# Patient Record
Sex: FEMALE | Race: WHITE | NOT HISPANIC OR LATINO | Employment: OTHER | URBAN - METROPOLITAN AREA
[De-identification: names, ages, dates, MRNs, and addresses within clinical notes are randomized per-mention and may not be internally consistent; named-entity substitution may affect disease eponyms.]

---

## 2017-02-24 ENCOUNTER — ALLSCRIPTS OFFICE VISIT (OUTPATIENT)
Dept: OTHER | Facility: OTHER | Age: 47
End: 2017-02-24

## 2017-02-28 LAB — PAP (HISTORICAL): NORMAL

## 2017-05-24 ENCOUNTER — GENERIC CONVERSION - ENCOUNTER (OUTPATIENT)
Dept: OTHER | Facility: OTHER | Age: 47
End: 2017-05-24

## 2017-06-09 ENCOUNTER — LAB CONVERSION - ENCOUNTER (OUTPATIENT)
Dept: OTHER | Facility: OTHER | Age: 47
End: 2017-06-09

## 2017-06-09 ENCOUNTER — GENERIC CONVERSION - ENCOUNTER (OUTPATIENT)
Dept: OTHER | Facility: OTHER | Age: 47
End: 2017-06-09

## 2017-06-09 LAB
ESTRADIOL, SENSITIVE (HISTORICAL): 351 PG/ML
FSH (HISTORICAL): 12.1 MIU/ML
TSH SERPL DL<=0.05 MIU/L-ACNC: 1.76 MIU/L

## 2017-11-09 ENCOUNTER — GENERIC CONVERSION - ENCOUNTER (OUTPATIENT)
Dept: OTHER | Facility: OTHER | Age: 47
End: 2017-11-09

## 2017-11-09 DIAGNOSIS — Z01.419 ENCOUNTER FOR GYNECOLOGICAL EXAMINATION WITHOUT ABNORMAL FINDING: ICD-10-CM

## 2017-11-21 ENCOUNTER — HOSPITAL ENCOUNTER (OUTPATIENT)
Dept: RADIOLOGY | Facility: HOSPITAL | Age: 47
Discharge: HOME | End: 2017-11-21
Attending: OBSTETRICS & GYNECOLOGY
Payer: COMMERCIAL

## 2017-11-21 DIAGNOSIS — Z01.419 ENCOUNTER FOR GYNECOLOGICAL EXAMINATION WITHOUT ABNORMAL FINDING: ICD-10-CM

## 2017-11-21 PROCEDURE — G0202 SCR MAMMO BI INCL CAD: HCPCS

## 2018-01-11 NOTE — MISCELLANEOUS
Message   Recorded as Task   Date: 06/09/2017 09:36 AM, Created By: Melissa Garcia   Task Name: Follow Up   Assigned To: [employer]   Regarding Patient: Deon Marti, Status: In Progress   Emperatriz Dwight - 09 Jun 2017 9:36 AM     TASK CREATED  Call Donal Pena her thyroid tests end hormone tests are normal range  Aura White - 09 Jun 2017 9:36 AM     TASK IN PROGRESS   [place],Aura - 09 Jun 2017 9:38 AM     TASK EDITED  Patient is aware of both tests results  Active Problems    1  Abnormal mammogram (793 80) (R92 8)   2  Abnormal perimenopausal bleeding (627 0) (N92 4)   3  Dysfunctional uterine bleeding (626 8) (N93 8)   4  Encounter for gynecological examination without abnormal finding (V72 31) (Z01 419)   5  Encounter for routine gynecological examination (V72 31) (Z01 419)   6  Encounter for routine gynecological examination (V72 31) (Z01 419)   7  Encounter for screening colonoscopy (V76 51) (Z12 11)   8  Encounter for screening mammogram for malignant neoplasm of breast (V76 12)   (Z12 31)   9  Need for prophylactic vaccination and inoculation against influenza (V04 81) (Z23)   10  Pap smear abnormality of cervix with ASCUS favoring benign (795 01) (R87 610)   11  Pap smear, as part of routine gynecological examination (V76 2) (Z01 419)   12  Visit for screening mammogram (V76 12) (Z12 31)    Current Meds   1  Multi-Vitamin TABS; Therapy: (Recorded:14Pqs7198) to Recorded    Allergies    1   No Known Drug Allergies    Signatures   Electronically signed by : Gualberto Spence, ; Jun 9 2017  9:39AM EST                       (Author)

## 2018-01-13 VITALS
DIASTOLIC BLOOD PRESSURE: 70 MMHG | SYSTOLIC BLOOD PRESSURE: 116 MMHG | WEIGHT: 172 LBS | HEIGHT: 61 IN | BODY MASS INDEX: 32.47 KG/M2

## 2018-01-13 NOTE — RESULT NOTES
Verified Results  * MAMMO SCREENING BILATERAL W CAD [id number] 10:13AM Valdo Tsang Order Number: [id number]     Test Name Result Flag Reference   MAMMO SCREENING BILATERAL W CAD (Report)     Patient History:   Patient is nulliparous  Family history of colorectal cancer in father at age 76  Benign WB stereo brst biopsy of the left breast, September 25, 2012  Pathology Report of both breasts, September 25, 2012  Took hormonal contraceptives for 16 years  Patient has never smoked  Patient's BMI is 31 4      Reason for exam: screening (asymptomatic)  Mammo Screening Bilateral W CAD: November 9, 2016 - Check In #:    [de-identified]   Bilateral CC and MLO view(s) were taken  Technologist: RT Macey(MELIDA)(M)   Prior study comparison: November 3, 2015, bilateral digital    screening mammogram performed at [address] [place] [address]     September 29, 2014, bilateral digital screening mammogram    performed at [address] [place] [address]      There are scattered fibroglandular densities  No dominant soft tissue mass, architectural distortion or    suspicious calcifications are noted in the left breast  The skin   and nipple contours are within normal limits  An irregular    density is present in the upper outer right breast  Spot    compression views recommended        ASSESSMENT: BiRad:0 - Incomplete: needs additional imaging    evaluation     A breast health care nurse from our facility will be contacting    the patient regarding the need for additional imaging  Recommendation:   Further imaging of the right breast    Analyzed by CAD     8-10% of cancers will be missed on mammography  Management of a    palpable abnormality must be based on clinical grounds  Patients   will be notified of their results via letter from our facility  Accredited by [employer] and FDA       Transcription Location: [facility] 98: [id number]     Risk Value(s): Sil-Sydnee 10 Year: 4 331%, Sil-Sydnee Lifetime: 22 662%,    Myriad Table: 1 5%, JOEL 5 Year: 0 9%, NCI Lifetime: 8 4%   Signed by:   Nolvia Watts MD   11/9/16                               Plan  Encounter for screening colonoscopy    · COLONOSCOPY ; every 5 years;  Last 36HLH8224; Next 51Rlf5690; Status:Active

## 2018-01-13 NOTE — MISCELLANEOUS
Message   Recorded as Task   Date: 11/18/2016 10:16 AM, Created By: Clarissa Obrien   Task Name: Follow Up   Assigned To: Canelo Maya   Regarding Patient: Bernice Salinas, Status: In Progress   Melany Wooten - 18 Nov 2016 10:16 AM     TASK CREATED  Call Miranda Rack her right breast ultrasound diagnostic mammogram were interpreted as normal she may return to routine screening bilateral mammogram in one year after her last exam    Cody Mccormack - 18 Nov 2016 10:25 AM     TASK IN PROGRESS   Cody Mccormack - 18 Nov 2016 10:47 AM     TASK EDITED                 lm making pt aware of results  Active Problems    1  Abnormal mammogram (793 80) (R92 8)   2  Encounter for gynecological examination without abnormal finding (V72 31) (Z01 419)   3  Encounter for routine gynecological examination (V72 31) (Z01 419)   4  Encounter for routine gynecological examination (V72 31) (Z01 419)   5  Encounter for screening colonoscopy (V76 51) (Z12 11)   6  Encounter for screening mammogram for malignant neoplasm of breast (V76 12)   (Z12 31)   7  Need for prophylactic vaccination and inoculation against influenza (V04 81) (Z23)   8  Pap smear abnormality of cervix with ASCUS favoring benign (795 01) (R87 610)   9  Pap smear, as part of routine gynecological examination (V76 2) (Z01 419)   10  Visit for screening mammogram (V76 12) (Z12 31)    Current Meds   1  Multi-Vitamin TABS; Therapy: (Recorded:51Ppe7859) to Recorded    Allergies    1   No Known Drug Allergies    Signatures   Electronically signed by : Nessa Moe LPN; Nov 18 2447 24:42PT EST                       (Author)

## 2018-01-14 NOTE — RESULT NOTES
Verified Results  (1) [facility] 51CZP5619 08:41AM [facility]     Test Name Result Flag Reference   [facility] 12 1 mIU/mL     Reference Range                        Follicular Phase       6 0-72 0               Mid-cycle Peak         3 1-17 7               Luteal Phase           1 5- 9 1               Postmenopausal       23 0-116 3     (1) ESTRADIOL 64OIL1403 08:41AM [facility]     Test Name Result Flag Reference   ESTRADIOL, ULTRASENSITIVE$LC/MS/ pg/mL     Adult Female Reference Ranges for Estradiol,    Ultrasensitive, LC/MS/MS:       Follicular Phase:       pg/mL    Luteal Phase:           pg/mL    Postmenopausal Phase: < or = 10 pg/mL        Pediatric Female Reference Ranges for Estradiol,    Ultrasensitive, LC/MS/MS:       Pre-pubertal      (1-9 years):     < or = 16 pg/mL    10-11 years:       < or = 65 pg/mL    12-14 years:       < or = 142 pg/mL    15-17 years:       < or = 283 pg/mL     This test was developed and its analytical performance  characteristics have been determined by [facility]  It has not been  cleared or approved by FDA  This assay has been validated  pursuant to the CLIA regulations and is used for clinical  purposes       (Q) TSH, 3RD GENERATION [id number] 08:41AM [facility]     Test Name Result Flag Reference   TSH 1 76 mIU/L     Reference Range                         > or = 20 Years  0 40-4 50                              Pregnancy Ranges            First trimester    0 26-2 66            Second trimester   0 55-2 73            Third trimester    0 43-2 91

## 2018-01-16 NOTE — RESULT NOTES
Verified Results  * MAMMO SCREENING BILATERAL W CAD [id number] 10:45AM Zuri Zavala Order Number: [id number]    - Patient Instructions: To schedule this appointment, please contact Central Scheduling at [phone]  Do not wear any perfume, powder, lotion or deodorant on breast or underarm area  Please bring your doctors order, referral (if needed) and insurance information with you on the day of the test  Failure to bring this information may result in this test being rescheduled  Arrive 15 minutes prior to your appointment time to register  On the day of your test, please bring any prior mammogram or breast studies with you that were not performed at a [facility]  Failure to bring prior exams may result in your test needing to be rescheduled  Test Name Result Flag Reference   MAMMO SCREENING BILATERAL W CAD (Report)     Patient History:   Patient is nulliparous  Family history of colorectal cancer at age 76 in father, breast    cancer at age 67 in paternal aunt, breast cancer at age 72 in    mother  Benign WB stereo brst biopsy of the left breast, September 25, 2012  Pathology Report of both breasts, September 25, 2012  Took hormonal contraceptives for 16 years  Patient has never smoked  Patient's BMI is 31 4      Reason for exam: screening, asymptomatic  Mammo Screening Bilateral W CAD: November 21, 2017 - Check In #:    [de-identified]   Bilateral MLO and CC view(s) were taken  XCCL view(s) were taken   of the left breast      Technologist: RT Maribel(R)(M)   Prior study comparison: November 15, 2016, mammo diagnostic right   W CAD, performed at [address] [place] [address]  November 9, 2016, mammo screening bilateral W CAD performed at [facility]      There are scattered fibroglandular densities       No dominant soft tissue mass, architectural distortion or    suspicious calcifications are noted in either breast   The skin    and nipple structures are within normal limits  Scattered benign   appearing calcifications are noted  No evidence of malignancy  No significant changes when compared with prior studies  ACR BI-RADSï¾® Assessments: BiRad:2 - Benign     Recommendation:   Routine screening mammogram of both breasts in 1 year  Analyzed by CAD     The patient is scheduled in a reminder system for screening    mammography  8-10% of cancers will be missed on mammography  Management of a    palpable abnormality must be based on clinical grounds  Patients   will be notified of their results via letter from our facility  Accredited by [employer] and FDA       Transcription Location: [facility] 98: [id number]     Risk Value(s):   Tyrer-Cuzick 10 Year: 5 600%, Tyrer-Cuzick Lifetime: 27 200%,    Myriad Table: 1 5%, JOEL 5 Year: 4 0%, NCI Lifetime: 26 5%   Signed by:   Dulce Magallanes MD   11/22/17

## 2018-01-17 NOTE — MISCELLANEOUS
Message   Recorded as Task   Date: 05/24/2017 11:03 AM, Created By: Bertin Rogel   Task Name: Medical Complaint Callback   Assigned To: Ta Vargas   Regarding Patient: Sharron Modi, Status: In Progress   Qirosalva Alvarez - 24 May 2017 11:03 AM     TASK CREATED  Caller: Self; Medical Complaint; [phone] (Home); [phone] (Work)  Patient calling because she is missing a period every other month since December  They last about 2-3 days at a time  When she saw Dr Daniel Marcum last she had missed one period and she had told him about it  Monique Hathaway - 24 May 2017 11:08 AM     TASK IN PROGRESS   Monique Hathaway - 24 May 2017 11:19 AM     TASK EDITED  Patient has been having irregular periods since December  She addressed this with you on her 2/24/17 appointment  Update on bleeding:    March- she had 2 days of light bleeding and cramps    April- no period    May-light bleeding and cramping    Not sure when her mom went through menopause  She doesn't think it was at a early age  No hot flashes or night sweats  Taking IBUprofen 800mg for her cramps  She is sexually active and using no birth control at the current time  She wants to know if she should be worried about her bleeding or if she should have a work up  I told her it may just be perimenopausal but I would discuss with you  Please advise  Monique Hathaway - 24 May 2017 11:20 AM     TASK REASSIGNED: Previously Assigned To [facility],Team  Send back to GYN team    Kimberley Carmichael - 24 May 2017 1:54 PM     TASK REPLIED TO: Previously Assigned To Maria C Burgos a serum FSH and estradiol level due to irregular bleeding  Order a serum TSH also   Monique Hathaway - 24 May 2017 2:03 PM     TASK EDITED  Shree with name of lab and with any questions  Need to order B/W in computer and send to patient via mail  Monique Hathaway - 24 May 2017 3:07 PM     TASK EDITED  [facility] lab slip sent to patient via mail     Monique Benito - 24 May 2017 3:07 PM     TASK COMPLETED   Rivas,Monique - 24 May 2017 3:07 PM     TASK REACTIVATED   Monique Hathaway - 24 May 2017 3:07 PM     TASK IN PROGRESS        Active Problems    1  Abnormal mammogram (793 80) (R92 8)   2  Abnormal perimenopausal bleeding (627 0) (N92 4)   3  Dysfunctional uterine bleeding (626 8) (N93 8)   4  Encounter for gynecological examination without abnormal finding (V72 31) (Z01 419)   5  Encounter for routine gynecological examination (V72 31) (Z01 419)   6  Encounter for routine gynecological examination (V72 31) (Z01 419)   7  Encounter for screening colonoscopy (V76 51) (Z12 11)   8  Encounter for screening mammogram for malignant neoplasm of breast (V76 12)   (Z12 31)   9  Need for prophylactic vaccination and inoculation against influenza (V04 81) (Z23)   10  Pap smear abnormality of cervix with ASCUS favoring benign (795 01) (R87 610)   11  Pap smear, as part of routine gynecological examination (V76 2) (Z01 419)   12  Visit for screening mammogram (V76 12) (Z12 31)    Current Meds   1  Multi-Vitamin TABS; Therapy: (Recorded:26Wyz4061) to Recorded    Allergies    1  No Known Drug Allergies    Plan  Dysfunctional uterine bleeding    · (1) ESTRADIOL; Status:Active - Retrospective Authorization; Requested for:50Buu8571;    · (1) FSH; Status:Active - Retrospective Authorization; Requested for:09Fsm8812;    · (1) TSH; Status:Active - Retrospective Authorization; Requested for:57Tlg8149;     Signatures   Electronically signed by :  Shahnaz Arnold, ; May 24 2017  3:07PM EST                       (Author)

## 2018-01-18 NOTE — MISCELLANEOUS
Message   Recorded as Task   Date: 11/09/2016 04:26 PM, Created By: Alexandria Whitaker   Task Name: Follow Up   Assigned To: Constantine Luke   Regarding Patient: Bahman Mendoza, Status: In Progress   Dayana Fer - 09 Nov 2016 4:26 PM     TASK CREATED  As Eddi Woodward been called to schedule a diagnostic right mammogram to evaluate a density in the breast    Pamela Arreagaon - 10 Nov 2016 8:54 AM     TASK IN PROGRESS   Pamela Katz - 10 Nov 2016 8:56 AM     TASK EDITED                 lmtcb, Rx in EHR  Pamelaalexander Arreagaon - 10 Nov 2016 9:09 AM     TASK EDITED                 made patient aware  Active Problems    1  Abnormal mammogram (793 80) (R92 8)   2  Encounter for gynecological examination without abnormal finding (V72 31) (Z01 419)   3  Encounter for routine gynecological examination (V72 31) (Z01 419)   4  Encounter for routine gynecological examination (V72 31) (Z01 419)   5  Encounter for screening colonoscopy (V76 51) (Z12 11)   6  Encounter for screening mammogram for malignant neoplasm of breast (V76 12)   (Z12 31)   7  Need for prophylactic vaccination and inoculation against influenza (V04 81) (Z23)   8  Pap smear abnormality of cervix with ASCUS favoring benign (795 01) (R87 610)   9  Pap smear, as part of routine gynecological examination (V76 2) (Z01 419)   10  Visit for screening mammogram (V76 12) (Z12 31)    Current Meds   1  Multi-Vitamin TABS; Therapy: (Recorded:04Kwb2518) to Recorded    Allergies    1   No Known Drug Allergies    Signatures   Electronically signed by : Jyotsna Chan LPN; Nov 10 6205  8:67WX EST                       (Author)

## 2018-01-22 VITALS
RESPIRATION RATE: 16 BRPM | OXYGEN SATURATION: 98 % | TEMPERATURE: 98.6 F | SYSTOLIC BLOOD PRESSURE: 100 MMHG | WEIGHT: 164.8 LBS | BODY MASS INDEX: 31.11 KG/M2 | HEIGHT: 61 IN | DIASTOLIC BLOOD PRESSURE: 62 MMHG | HEART RATE: 60 BPM

## 2018-02-12 ENCOUNTER — OFFICE VISIT (OUTPATIENT)
Dept: INTERNAL MEDICINE CLINIC | Age: 48
End: 2018-02-12
Payer: COMMERCIAL

## 2018-02-12 VITALS
DIASTOLIC BLOOD PRESSURE: 54 MMHG | SYSTOLIC BLOOD PRESSURE: 100 MMHG | BODY MASS INDEX: 32.36 KG/M2 | HEART RATE: 86 BPM | HEIGHT: 61 IN | OXYGEN SATURATION: 97 % | TEMPERATURE: 98.2 F | WEIGHT: 171.4 LBS

## 2018-02-12 DIAGNOSIS — Z13.220 SCREENING FOR HYPERLIPIDEMIA: Primary | ICD-10-CM

## 2018-02-12 DIAGNOSIS — H65.192 ACUTE EFFUSION OF LEFT EAR: ICD-10-CM

## 2018-02-12 DIAGNOSIS — Z00.00 HEALTHCARE MAINTENANCE: ICD-10-CM

## 2018-02-12 PROBLEM — L98.9 SKIN LESION OF SCALP: Status: ACTIVE | Noted: 2017-11-09

## 2018-02-12 PROCEDURE — 99213 OFFICE O/P EST LOW 20 MIN: CPT | Performed by: NURSE PRACTITIONER

## 2018-02-12 RX ORDER — FLUTICASONE PROPIONATE 50 MCG
2 SPRAY, SUSPENSION (ML) NASAL DAILY
Qty: 16 G | Refills: 0 | Status: SHIPPED | COMMUNITY
Start: 2018-02-12 | End: 2018-03-20

## 2018-02-12 NOTE — PROGRESS NOTES
Assessment/Plan:33L ear effusion:  No need for abx  Will start flonase  Return for new/worsening s/sx  Pt given lab slips for routine labs  Last labs 2016  Pt to schedule routine follow-up as well  Diagnoses and all orders for this visit:    Screening for hyperlipidemia  -     Comprehensive metabolic panel; Future  -     CBC and differential; Future  -     Lipid panel; Future    Healthcare maintenance  -     Comprehensive metabolic panel; Future  -     CBC and differential; Future  -     Lipid panel; Future    Acute effusion of left ear  -     fluticasone (FLONASE) 50 mcg/act nasal spray; 2 sprays into each nostril daily        Subjective:      Patient ID: Jenn Lerma is a 56 y o  female  HPI     Pt c/o L ear pain since yesterday  Pt describes as a sharp, shooting pain  Denies tinnitus  No decreased hearing  Pt states her L lower teeth hurt yesterday, but has since improved  No fever/chills  No cough, congestion, sore throat  No ill contacts  Pt did not get influenza this year  The following portions of the patient's history were reviewed and updated as appropriate: allergies, current medications, past family history, past medical history, past social history, past surgical history and problem list     Review of Systems   Constitutional: Negative for chills and fever  HENT: Positive for ear pain and postnasal drip  Negative for congestion, rhinorrhea, sinus pressure and sore throat  Eyes: Negative for visual disturbance  Respiratory: Negative for cough  Cardiovascular: Negative for chest pain  Gastrointestinal: Negative for nausea and vomiting  Musculoskeletal: Negative for myalgias  Skin: Negative for rash  Neurological: Negative for headaches  Past Medical History:   Diagnosis Date    Benign tumor of breast     left breast         Current Outpatient Prescriptions:     Multiple Vitamin (MULTIVITAMIN) tablet, Take 2 tablets by mouth daily    , Disp: , Rfl:     No Known Allergies    Social History   Past Surgical History:   Procedure Laterality Date    APPENDECTOMY      BREAST LUMPECTOMY Left     BREAST SURGERY      IA COLONOSCOPY FLX DX W/COLLJ SPEC WHEN PFRMD N/A 7/20/2016    Procedure: COLONOSCOPY;  Surgeon: Delphine Vicente MD;  Location: [facility]; Service: Gastroenterology     Family History   Problem Relation Age of Onset    Cancer Father     Colon cancer Father     Liver cancer Father     Breast cancer Mother     Lung cancer Mother     Liver cancer Mother        Objective:  /54 (BP Location: Left arm, Patient Position: Sitting, Cuff Size: Standard)   Pulse 86   Temp 98 2 °F (36 8 °C) (Tympanic)   Ht 5' 0 83" (1 545 m)   Wt 77 7 kg (171 lb 6 4 oz)   SpO2 97%   BMI 32 57 kg/m²      Physical Exam   Constitutional: She is oriented to person, place, and time  She appears well-developed and well-nourished  No distress  HENT:   Head: Normocephalic and atraumatic  Right Ear: Hearing, tympanic membrane, external ear and ear canal normal    Left Ear: Hearing, external ear and ear canal normal    Nose: Nose normal  Right sinus exhibits no maxillary sinus tenderness and no frontal sinus tenderness  Left sinus exhibits no maxillary sinus tenderness and no frontal sinus tenderness  Mouth/Throat: Uvula is midline, oropharynx is clear and moist and mucous membranes are normal    L ear mild effusion  + scarring noted to Tm  No erythema  Cardiovascular: Normal rate and regular rhythm  Pulmonary/Chest: Effort normal and breath sounds normal  No respiratory distress  She has no wheezes  Lymphadenopathy:     She has no cervical adenopathy  Neurological: She is alert and oriented to person, place, and time  Skin: Skin is warm and dry  Psychiatric: She has a normal mood and affect  Her behavior is normal  Judgment and thought content normal    Nursing note and vitals reviewed

## 2018-02-12 NOTE — PATIENT INSTRUCTIONS
L ear effusion:  No need for abx  Will start flonase  Return for new/worsening s/sx  Pt given lab slips for routine labs  Last labs 2016  Pt to schedule routine follow-up as well

## 2018-02-26 ENCOUNTER — OFFICE VISIT (OUTPATIENT)
Dept: OBGYN CLINIC | Facility: CLINIC | Age: 48
End: 2018-02-26
Payer: COMMERCIAL

## 2018-02-26 VITALS
HEIGHT: 61 IN | DIASTOLIC BLOOD PRESSURE: 60 MMHG | SYSTOLIC BLOOD PRESSURE: 100 MMHG | BODY MASS INDEX: 32.1 KG/M2 | WEIGHT: 170 LBS

## 2018-02-26 DIAGNOSIS — Z01.419 ENCOUNTER FOR GYNECOLOGICAL EXAMINATION (GENERAL) (ROUTINE) WITHOUT ABNORMAL FINDINGS: ICD-10-CM

## 2018-02-26 DIAGNOSIS — Z12.31 ENCOUNTER FOR SCREENING MAMMOGRAM FOR MALIGNANT NEOPLASM OF BREAST: Primary | ICD-10-CM

## 2018-02-26 PROCEDURE — G0145 SCR C/V CYTO,THINLAYER,RESCR: HCPCS | Performed by: OBSTETRICS & GYNECOLOGY

## 2018-02-26 PROCEDURE — 99396 PREV VISIT EST AGE 40-64: CPT | Performed by: OBSTETRICS & GYNECOLOGY

## 2018-02-26 NOTE — PROGRESS NOTES
Assessment/Plan: This 50-year-old patient is seen for her annual gyn evaluation  She still does require the use of Motrin 800 mg at 6-8 hours for menstrual cramps the 1st day of her  No problem-specific Assessment & Plan notes found for this encounter  Subjective:      Patient ID: Wilberto Cabral is a 56 y o  female  Wound this 56year-old patient has regular monthly menses a 30 day intervals lasting about 2 days  Her 1st day is heavy requiring changing of temp packs or pads every 3-4 hours and  She does require the use of Motrin 800 mg at 6-8 hour intervals for menstrual cramps the 1st day of her     She does not bleed in between periods she does have some dryness and discomfort with intercourse and I did suggest that she try a lubricant with intercourse  She has no hot flashes chronic headaches or fainting spells  She sleeps about 7 hours at night  Her bowel and bladder function are normal             Review of Systems   Constitutional: Negative  HENT: Negative  Eyes: Negative  Respiratory: Negative  Cardiovascular: Negative  Gastrointestinal: Negative  Endocrine: Negative  Genitourinary: Negative  Musculoskeletal: Negative  Skin: Negative  Allergic/Immunologic: Negative  Neurological: Negative  Hematological: Negative  Psychiatric/Behavioral: Negative  Objective:      /60 (BP Location: Right arm, Patient Position: Sitting, Cuff Size: Standard)   Ht 5' 1" (1 549 m)   Wt 77 1 kg (170 lb)   LMP 02/07/2018 (Exact Date)   BMI 32 12 kg/m²          Physical Exam   Constitutional: She is oriented to person, place, and time  She appears well-developed and well-nourished  HENT:   Head: Normocephalic  Neck: Normal range of motion  Neck supple  Cardiovascular: Normal rate, regular rhythm, normal heart sounds and intact distal pulses  Pulmonary/Chest: Effort normal and breath sounds normal    Abdominal: Soft   Bowel sounds are normal  Genitourinary: Uterus normal    Musculoskeletal: Normal range of motion  Neurological: She is alert and oriented to person, place, and time  Skin: Skin is warm and dry  Psychiatric: She has a normal mood and affect  Nursing note and vitals reviewed  uterus is anterior mobile normal size and well supported  No ovarian masses are identified  The cervix is normal to appearance  There is no blood or discharge in the vagina  The vulva is normal  Rectal exam shows no bladder masses in the rectum and no nodularity in the cul-de-sac   Breast exam is normal

## 2018-02-26 NOTE — PATIENT INSTRUCTIONS
The patient was told that her breast and pelvic exam are normal  I did give her a prescription for Motrin 800 mg to repeat every 6-8 hours as needed for severe cramps number 50 with 2 refills  She will return in 1 year for followup gyn evaluation

## 2018-03-05 LAB
LAB AP GYN PRIMARY INTERPRETATION: NORMAL
Lab: NORMAL

## 2018-03-12 ENCOUNTER — TRANSCRIBE ORDERS (OUTPATIENT)
Dept: LAB | Age: 48
End: 2018-03-12

## 2018-03-12 ENCOUNTER — APPOINTMENT (OUTPATIENT)
Dept: LAB | Age: 48
End: 2018-03-12
Payer: COMMERCIAL

## 2018-03-12 DIAGNOSIS — Z00.00 HEALTHCARE MAINTENANCE: ICD-10-CM

## 2018-03-12 DIAGNOSIS — Z13.220 SCREENING FOR HYPERLIPIDEMIA: ICD-10-CM

## 2018-03-12 LAB
ALBUMIN SERPL BCP-MCNC: 3.7 G/DL (ref 3.5–5)
ALP SERPL-CCNC: 92 U/L (ref 46–116)
ALT SERPL W P-5'-P-CCNC: 61 U/L (ref 12–78)
ANION GAP SERPL CALCULATED.3IONS-SCNC: 7 MMOL/L (ref 4–13)
AST SERPL W P-5'-P-CCNC: 37 U/L (ref 5–45)
BASOPHILS # BLD AUTO: 0.04 THOUSANDS/ΜL (ref 0–0.1)
BASOPHILS NFR BLD AUTO: 1 % (ref 0–1)
BILIRUB SERPL-MCNC: 0.43 MG/DL (ref 0.2–1)
BUN SERPL-MCNC: 21 MG/DL (ref 5–25)
CALCIUM SERPL-MCNC: 8.9 MG/DL (ref 8.3–10.1)
CHLORIDE SERPL-SCNC: 104 MMOL/L (ref 100–108)
CHOLEST SERPL-MCNC: 171 MG/DL (ref 50–200)
CO2 SERPL-SCNC: 27 MMOL/L (ref 21–32)
CREAT SERPL-MCNC: 0.79 MG/DL (ref 0.6–1.3)
EOSINOPHIL # BLD AUTO: 0.2 THOUSAND/ΜL (ref 0–0.61)
EOSINOPHIL NFR BLD AUTO: 3 % (ref 0–6)
ERYTHROCYTE [DISTWIDTH] IN BLOOD BY AUTOMATED COUNT: 12.8 % (ref 11.6–15.1)
GFR SERPL CREATININE-BSD FRML MDRD: 90 ML/MIN/1.73SQ M
GLUCOSE P FAST SERPL-MCNC: 99 MG/DL (ref 65–99)
HCT VFR BLD AUTO: 39.6 % (ref 34.8–46.1)
HDLC SERPL-MCNC: 46 MG/DL (ref 40–60)
HGB BLD-MCNC: 13.3 G/DL (ref 11.5–15.4)
LDLC SERPL CALC-MCNC: 107 MG/DL (ref 0–100)
LYMPHOCYTES # BLD AUTO: 2.12 THOUSANDS/ΜL (ref 0.6–4.47)
LYMPHOCYTES NFR BLD AUTO: 28 % (ref 14–44)
MCH RBC QN AUTO: 28.8 PG (ref 26.8–34.3)
MCHC RBC AUTO-ENTMCNC: 33.6 G/DL (ref 31.4–37.4)
MCV RBC AUTO: 86 FL (ref 82–98)
MONOCYTES # BLD AUTO: 0.62 THOUSAND/ΜL (ref 0.17–1.22)
MONOCYTES NFR BLD AUTO: 8 % (ref 4–12)
NEUTROPHILS # BLD AUTO: 4.57 THOUSANDS/ΜL (ref 1.85–7.62)
NEUTS SEG NFR BLD AUTO: 60 % (ref 43–75)
NRBC BLD AUTO-RTO: 0 /100 WBCS
PLATELET # BLD AUTO: 279 THOUSANDS/UL (ref 149–390)
PMV BLD AUTO: 10.4 FL (ref 8.9–12.7)
POTASSIUM SERPL-SCNC: 4.2 MMOL/L (ref 3.5–5.3)
PROT SERPL-MCNC: 7.4 G/DL (ref 6.4–8.2)
RBC # BLD AUTO: 4.62 MILLION/UL (ref 3.81–5.12)
SODIUM SERPL-SCNC: 138 MMOL/L (ref 136–145)
TRIGL SERPL-MCNC: 91 MG/DL
WBC # BLD AUTO: 7.56 THOUSAND/UL (ref 4.31–10.16)

## 2018-03-12 PROCEDURE — 85025 COMPLETE CBC W/AUTO DIFF WBC: CPT

## 2018-03-12 PROCEDURE — 80053 COMPREHEN METABOLIC PANEL: CPT

## 2018-03-12 PROCEDURE — 36415 COLL VENOUS BLD VENIPUNCTURE: CPT

## 2018-03-12 PROCEDURE — 80061 LIPID PANEL: CPT

## 2018-03-20 ENCOUNTER — OFFICE VISIT (OUTPATIENT)
Dept: INTERNAL MEDICINE CLINIC | Age: 48
End: 2018-03-20
Payer: COMMERCIAL

## 2018-03-20 VITALS
HEIGHT: 61 IN | TEMPERATURE: 97.3 F | BODY MASS INDEX: 32.44 KG/M2 | DIASTOLIC BLOOD PRESSURE: 64 MMHG | WEIGHT: 171.8 LBS | OXYGEN SATURATION: 98 % | SYSTOLIC BLOOD PRESSURE: 102 MMHG | HEART RATE: 62 BPM

## 2018-03-20 DIAGNOSIS — L98.9 SKIN LESION OF SCALP: Primary | ICD-10-CM

## 2018-03-20 PROBLEM — H65.192 ACUTE EFFUSION OF LEFT EAR: Status: RESOLVED | Noted: 2018-02-12 | Resolved: 2018-03-20

## 2018-03-20 PROCEDURE — 99213 OFFICE O/P EST LOW 20 MIN: CPT | Performed by: NURSE PRACTITIONER

## 2018-03-20 NOTE — PROGRESS NOTES
Assessment/Plan:    Scalp lesion:  Patient has a [facility]-up appointment with [facility]  Also given contact information for Dr Citlaly Davis in [place] to see if she possibly get a sooner follow-up  Patient will likely need a biopsy concern for squamous cell  Labs reviewed  Patient is continue with healthy lifestyle  Continue with a healthy diet-low-fat low-cholesterol limit sweets  Continue to exercise on a routine basis  Continue to trend blood pressure  Patient is asymptomatic  Patient follow-up in 1 year and as needed     Diagnoses and all orders for this visit:    Skin lesion of scalp        Subjective:      Patient ID: Sasha Aguilera is a 56 y o  female  HPI     Pt here for routine follow-up and to review labs  Pt is doing well with no concerns  Pt states that she has spot on top of her head that she is due to see Dermatology for  Patient has an appointment April 30th with [facility]  Patient would like to be seen sooner  States that her  has been monitoring and appears area has been getting bigger  Patient states that she does have itching to the area  Denies bleeding  Patient is not currently using any medication on area  Of note patient has had this lesion since around September 2017  Last Mammo: 11/2017 - followed by GYN - seen feb  PAP UTD  Colonoscopy:  Patient does have a family history of colon cancer  Her last colonoscopy was at age 41  Her next is to due at 53  Last labs:  03/2018    The following portions of the patient's history were reviewed and updated as appropriate: allergies, current medications, past family history, past medical history, past social history, past surgical history and problem list     Review of Systems   Constitutional: Negative for chills, fatigue and fever  HENT: Positive for postnasal drip  Negative for congestion and sore throat  Eyes: Positive for visual disturbance (wear glass  no changes in vision  last eye exam 1 year ago)  Respiratory: Positive for cough (lingering cough from URI - nonproductive)  Negative for chest tightness and shortness of breath  Cardiovascular: Negative for chest pain, palpitations and leg swelling  Gastrointestinal: Negative for abdominal pain, blood in stool, constipation, diarrhea, nausea and vomiting  Genitourinary: Negative for difficulty urinating, dysuria, hematuria and vaginal bleeding  Musculoskeletal: Negative for arthralgias and myalgias  Skin: Positive for wound  Negative for rash  Neurological: Negative for dizziness, syncope, light-headedness and headaches  Psychiatric/Behavioral: Negative for confusion, dysphoric mood and sleep disturbance  The patient is not nervous/anxious  Past Medical History:   Diagnosis Date    Abnormal mammogram     Benign tumor of breast     left breast    Dysfunctional uterine bleeding     Resolved: 11/9/2017    Skin lesion of scalp     last assessed: 11/9/2017         Current Outpatient Prescriptions:     fluticasone (FLONASE) 50 mcg/act nasal spray, 2 sprays into each nostril daily, Disp: 16 g, Rfl: 0    Multiple Vitamin (MULTIVITAMIN) tablet, Take 2 tablets by mouth daily  , Disp: , Rfl:     No Known Allergies    Social History   Past Surgical History:   Procedure Laterality Date    APPENDECTOMY      BREAST LUMPECTOMY Left     BREAST SURGERY Left 09/2012    bx    [place] COLONOSCOPY FLX DX W/COLLJ SPEC WHEN PFRMD N/A 7/20/2016    Procedure: COLONOSCOPY;  Surgeon: Marelyn Hammans, MD;  [facility];   Service: Gastroenterology     Family History   Problem Relation Age of Onset    Cancer Father     Colon cancer Father     Liver cancer Father     Bone cancer Father    Jillian Porter Breast cancer Mother     Lung cancer Mother     Liver cancer Mother        Objective:  /64 (BP Location: Left arm, Patient Position: Sitting, Cuff Size: Standard)   Pulse 62   Temp (!) 97 3 °F (36 3 °C) (Tympanic)   Ht 5' 0 79" (1 544 m)   Wt 77 9 kg (171 lb 12 8 oz)   SpO2 98%   BMI 32 69 kg/m²      Physical Exam   Constitutional: She is oriented to person, place, and time  She appears well-developed and well-nourished  No distress  Neck: No thyromegaly present  Cardiovascular: Normal rate and regular rhythm  Pulmonary/Chest: Effort normal and breath sounds normal  No respiratory distress  She has no wheezes  Musculoskeletal: Normal range of motion  Neurological: She is alert and oriented to person, place, and time  No focal deficits   Skin: Skin is warm and dry  Lesion (L frontal scalp  ulceration, flesh colored with some areas of redness  no underlying eryhema, drainage or concern for infection  no plaque, vesicles or pustules  when compared to picture of 11/2017 - positive change in color, now more flesh colored ) noted  No abrasion, no bruising, no burn, no ecchymosis, no laceration, no petechiae and no rash noted  No erythema  Thinning of hair to top of scalp   Psychiatric: She has a normal mood and affect   Her behavior is normal  Judgment and thought content normal

## 2018-03-20 NOTE — PATIENT INSTRUCTIONS
Pt is to follow-up with Dermatology for scalp lesion  Also recommend Dr Joaquina Hills:  [phone]:  [id number]    Continue with healthy lifestyle - healthy diet, low fat/low cholesterol  Exercise on a routine basis

## 2018-05-14 ENCOUNTER — OFFICE VISIT (OUTPATIENT)
Dept: OBGYN CLINIC | Facility: CLINIC | Age: 48
End: 2018-05-14
Payer: COMMERCIAL

## 2018-05-14 VITALS
WEIGHT: 179 LBS | SYSTOLIC BLOOD PRESSURE: 118 MMHG | BODY MASS INDEX: 33.79 KG/M2 | HEIGHT: 61 IN | DIASTOLIC BLOOD PRESSURE: 68 MMHG

## 2018-05-14 DIAGNOSIS — Z01.419 ENCOUNTER FOR GYNECOLOGICAL EXAMINATION (GENERAL) (ROUTINE) WITHOUT ABNORMAL FINDINGS: ICD-10-CM

## 2018-05-14 DIAGNOSIS — N91.2 AMENORRHEA: Primary | ICD-10-CM

## 2018-05-14 DIAGNOSIS — Z12.31 ENCOUNTER FOR SCREENING MAMMOGRAM FOR MALIGNANT NEOPLASM OF BREAST: ICD-10-CM

## 2018-05-14 PROCEDURE — 99396 PREV VISIT EST AGE 40-64: CPT | Performed by: OBSTETRICS & GYNECOLOGY

## 2018-05-14 NOTE — PROGRESS NOTES
Assessment/Plan: This 60-year-old patient is concerned because she not had a menses since February 2018  Diagnoses and all orders for this visit:    Encounter for gynecological examination (general) (routine) without abnormal findings    Encounter for screening mammogram for malignant neoplasm of breast  -     Mammo screening bilateral w cad; Future          Subjective:     Patient ID: Craig Raygoza is a 56 y o  female  HPI this 60-year-old patient has had regular menses lasting about 3 days requiring Motrin 800 mg for pain   Since February 2018 she has had no menses  She has some preliminary signs of having a period but it  is not happening  She has noticed some hot flashes and discomfort with intercourse  Review of Systems      Objective:     Physical Exam  pelvic exam reveals uterus to be anterior mobile normal size  No adnexal masses are present  There is no blood or discharge in the vagina

## 2018-05-16 ENCOUNTER — HOSPITAL ENCOUNTER (OUTPATIENT)
Dept: RADIOLOGY | Facility: HOSPITAL | Age: 48
Discharge: HOME | End: 2018-05-16
Attending: OBSTETRICS & GYNECOLOGY
Payer: COMMERCIAL

## 2018-05-16 ENCOUNTER — TRANSCRIBE ORDERS (OUTPATIENT)
Dept: LAB | Age: 48
End: 2018-05-16

## 2018-05-16 ENCOUNTER — APPOINTMENT (OUTPATIENT)
Dept: LAB | Age: 48
End: 2018-05-16
Payer: COMMERCIAL

## 2018-05-16 DIAGNOSIS — N91.2 AMENORRHEA: Primary | ICD-10-CM

## 2018-05-16 DIAGNOSIS — N91.2 AMENORRHEA: ICD-10-CM

## 2018-05-16 LAB
ESTRADIOL SERPL-MCNC: 129 PG/ML
FSH SERPL-ACNC: 16.3 MIU/ML

## 2018-05-16 PROCEDURE — 82670 ASSAY OF TOTAL ESTRADIOL: CPT

## 2018-05-16 PROCEDURE — 76830 TRANSVAGINAL US NON-OB: CPT

## 2018-05-16 PROCEDURE — 36415 COLL VENOUS BLD VENIPUNCTURE: CPT

## 2018-05-16 PROCEDURE — 76856 US EXAM PELVIC COMPLETE: CPT

## 2018-05-16 PROCEDURE — 83001 ASSAY OF GONADOTROPIN (FSH): CPT

## 2018-05-16 RX ORDER — MEDROXYPROGESTERONE ACETATE 10 MG/1
TABLET ORAL
Qty: 10 TABLET | Refills: 0 | Status: SHIPPED | OUTPATIENT
Start: 2018-05-16 | End: 2018-08-27 | Stop reason: ALTCHOICE

## 2018-05-16 NOTE — TELEPHONE ENCOUNTER
----- Message from Jayla Maher MD sent at 5/16/2018 12:48 PM EDT -----  Please call the patient regarding blood tests  Her blood does to not indicate menopause  Tell her to take provera 10 mg 2 at bedtime for 5 nights and let me know if she bleeds after he medication     If there is no menses  within 2 weeks of the 5 days of medication call and tell us  that she did  not bleed

## 2018-05-16 NOTE — TELEPHONE ENCOUNTER
----- Message from Artie Spence MD sent at 5/16/2018 12:48 PM EDT -----  Please call the patient regarding blood tests  Her blood does to not indicate menopause  Tell her to take provera 10 mg 2 at bedtime for 5 nights and let me know if she bleeds after he medication     If there is no menses  within 2 weeks of the 5 days of medication call and tell us  that she did  not bleed

## 2018-05-21 ENCOUNTER — TELEPHONE (OUTPATIENT)
Dept: OBGYN CLINIC | Facility: CLINIC | Age: 48
End: 2018-05-21

## 2018-05-21 NOTE — TELEPHONE ENCOUNTER
Spoke with Pt today via phone call  Pt informed Dr Christen Vinson reviewed Pt's recent pelvic ultrasound result  Ultrasound showed 2 small fibroids in Pt's uterus, lining of the uterus was consistent with being premenopausal (uterine lining measured 9 mm), ovaries were normal per Dr Carlos Irizarry review  Reiterated to Pt that if she has any questions/concerns, to contact office

## 2018-05-21 NOTE — TELEPHONE ENCOUNTER
----- Message from Yadira Aviles MD sent at 5/21/2018  8:13 AM EDT -----  Please call the patient regarding her pelvic ultrasound  Her ultrasound showed 2 small fibroids in her uterus  The lining of the uterus was consistent with being premenopausal   It measured 9 mm    Her ovaries were normal

## 2018-08-27 ENCOUNTER — OFFICE VISIT (OUTPATIENT)
Dept: INTERNAL MEDICINE CLINIC | Age: 48
End: 2018-08-27
Payer: COMMERCIAL

## 2018-08-27 VITALS
DIASTOLIC BLOOD PRESSURE: 70 MMHG | OXYGEN SATURATION: 97 % | WEIGHT: 185.8 LBS | BODY MASS INDEX: 34.19 KG/M2 | HEIGHT: 62 IN | TEMPERATURE: 98.4 F | HEART RATE: 85 BPM | SYSTOLIC BLOOD PRESSURE: 102 MMHG

## 2018-08-27 DIAGNOSIS — R06.00 DYSPNEA ON EXERTION: ICD-10-CM

## 2018-08-27 DIAGNOSIS — R63.5 WEIGHT GAIN: ICD-10-CM

## 2018-08-27 DIAGNOSIS — R60.0 BILATERAL LEG EDEMA: Primary | ICD-10-CM

## 2018-08-27 PROBLEM — Z13.220 SCREENING FOR HYPERLIPIDEMIA: Status: RESOLVED | Noted: 2018-02-12 | Resolved: 2018-08-27

## 2018-08-27 PROBLEM — Z00.00 HEALTHCARE MAINTENANCE: Status: RESOLVED | Noted: 2018-02-12 | Resolved: 2018-08-27

## 2018-08-27 PROBLEM — R06.09 DYSPNEA ON EXERTION: Status: ACTIVE | Noted: 2018-08-27

## 2018-08-27 PROCEDURE — 99214 OFFICE O/P EST MOD 30 MIN: CPT | Performed by: NURSE PRACTITIONER

## 2018-08-27 NOTE — PROGRESS NOTES
Assessment/Plan:    Pt presents for LE edema  Likely multifactorial with recent humidity, weight gain  Recommend elevation, low sodium diet and compression stockings as needed  Denies calf pain  No concern for DVT  She does experience exercise intolerance and intermittently dyspnea on exertion  likely secondary to weight gain and decreased exercise, however will check echo  Pt to follow-up in 2 weeks  She would like to discuss weight loss medication at that time  Diagnoses and all orders for this visit:    Bilateral leg edema  -     CBC and differential; Future  -     Comprehensive metabolic panel; Future  -     TSH, 3rd generation with Free T4 reflex; Future  -     Echo complete with contrast if indicated; Future    Dyspnea on exertion  -     CBC and differential; Future  -     Comprehensive metabolic panel; Future  -     TSH, 3rd generation with Free T4 reflex; Future  -     Echo complete with contrast if indicated; Future    Weight gain  -     TSH, 3rd generation with Free T4 reflex; Future        Subjective:      Patient ID: Stephane Tellez is a 53 y o  female  HPI    Pt presents for concern of elevated BP  She has no PMH of HTN and actually her BP has been low the last few appts  However she states that her son in law noticed she had swelling to B/L LE  Pt reports that she has noticed swelling to hands as well recently  Additionally she has noticed that she has decreased exercise tolerance and intermittent dyspnea on exertion  She reports a gradual weight gain of 15 lbs in the past 6 months  She has had no change in diet habits or exercise routine  Pt states she works as a , she does not sit for prolonged periods of time however  She denies high sodium diet    No PMH of HTN, CHF    The following portions of the patient's history were reviewed and updated as appropriate: allergies, current medications, past family history, past medical history, past social history, past surgical history and problem list     Review of Systems   Constitutional: Positive for unexpected weight change (gradual)  Negative for activity change, appetite change, chills and fever  Respiratory: Negative for chest tightness, shortness of breath and stridor  Exercise intolerance   Cardiovascular: Positive for leg swelling  Negative for chest pain and palpitations  Gastrointestinal: Negative for abdominal pain, constipation, diarrhea, nausea and vomiting  Endocrine: Positive for heat intolerance  Musculoskeletal: Negative for arthralgias, joint swelling and neck pain  Neurological: Negative for dizziness, syncope, light-headedness and headaches  Past Medical History:   Diagnosis Date    Abnormal mammogram     Benign tumor of breast     left breast    Dysfunctional uterine bleeding     Resolved: 11/9/2017    Skin lesion of scalp     last assessed: 11/9/2017         Current Outpatient Prescriptions:     medroxyPROGESTERone (PROVERA) 10 mg tablet, Take 2 tablets by mouth at bedtime for 5 nights , Disp: 10 tablet, Rfl: 0    Multiple Vitamin (MULTIVITAMIN) tablet, Take 2 tablets by mouth daily  , Disp: , Rfl:     No Known Allergies    Social History   Past Surgical History:   Procedure Laterality Date    APPENDECTOMY      BREAST LUMPECTOMY Left     BREAST SURGERY Left 09/2012    bx    [place] COLONOSCOPY FLX DX W/COLLJ SPEC WHEN PFRMD N/A 7/20/2016    Procedure: COLONOSCOPY;  Surgeon: Pedro Moura MD;  [facility];   Service: Gastroenterology     Family History   Problem Relation Age of Onset    Cancer Father     Colon cancer Father     Liver cancer Father     Bone cancer Father    [facility] Breast cancer Mother     Lung cancer Mother     Liver cancer Mother        Objective:  /70 (BP Location: Left arm, Patient Position: Sitting, Cuff Size: Standard)   Pulse 85   Temp 98 4 °F (36 9 °C) (Tympanic)   Ht 5' 1 89" (1 572 m)   Wt 84 3 kg (185 lb 12 8 oz)   SpO2 97%   BMI 34 10 kg/m²      Physical Exam   Constitutional: She is oriented to person, place, and time  She appears well-developed and well-nourished  No distress  obese   Neck: No JVD present  Cardiovascular: Normal rate, regular rhythm and normal heart sounds  No murmur heard  Trace B/L LE edema   Pulmonary/Chest: Effort normal and breath sounds normal  No respiratory distress  She has no wheezes  Musculoskeletal: Normal range of motion  She exhibits no edema, tenderness or deformity  Neurological: She is alert and oriented to person, place, and time  Skin: Skin is warm and dry  No rash noted  No erythema  Psychiatric: She has a normal mood and affect  Her behavior is normal  Judgment and thought content normal    Nursing note and vitals reviewed

## 2018-08-27 NOTE — PATIENT INSTRUCTIONS
Will check labs and echo  Likely multifactorial with humidity, sodium intake and weight contributing  Elevate legs in evening, reduce sodium

## 2018-08-28 ENCOUNTER — APPOINTMENT (OUTPATIENT)
Dept: LAB | Age: 48
End: 2018-08-28
Payer: COMMERCIAL

## 2018-08-28 ENCOUNTER — HOSPITAL ENCOUNTER (OUTPATIENT)
Dept: NON INVASIVE DIAGNOSTICS | Facility: HOSPITAL | Age: 48
Discharge: HOME | End: 2018-08-28
Payer: COMMERCIAL

## 2018-08-28 DIAGNOSIS — R06.00 DYSPNEA ON EXERTION: ICD-10-CM

## 2018-08-28 DIAGNOSIS — R60.0 BILATERAL LEG EDEMA: ICD-10-CM

## 2018-08-28 DIAGNOSIS — R63.5 WEIGHT GAIN: ICD-10-CM

## 2018-08-28 LAB
ALBUMIN SERPL BCP-MCNC: 3.2 G/DL (ref 3.5–5)
ALP SERPL-CCNC: 87 U/L (ref 46–116)
ALT SERPL W P-5'-P-CCNC: 48 U/L (ref 12–78)
ANION GAP SERPL CALCULATED.3IONS-SCNC: 7 MMOL/L (ref 4–13)
AST SERPL W P-5'-P-CCNC: 34 U/L (ref 5–45)
BASOPHILS # BLD AUTO: 0.04 THOUSANDS/ΜL (ref 0–0.1)
BASOPHILS NFR BLD AUTO: 0 % (ref 0–1)
BILIRUB SERPL-MCNC: 0.51 MG/DL (ref 0.2–1)
BUN SERPL-MCNC: 14 MG/DL (ref 5–25)
CALCIUM SERPL-MCNC: 8.9 MG/DL (ref 8.3–10.1)
CHLORIDE SERPL-SCNC: 104 MMOL/L (ref 100–108)
CO2 SERPL-SCNC: 25 MMOL/L (ref 21–32)
CREAT SERPL-MCNC: 0.72 MG/DL (ref 0.6–1.3)
EOSINOPHIL # BLD AUTO: 0.12 THOUSAND/ΜL (ref 0–0.61)
EOSINOPHIL NFR BLD AUTO: 1 % (ref 0–6)
ERYTHROCYTE [DISTWIDTH] IN BLOOD BY AUTOMATED COUNT: 12.5 % (ref 11.6–15.1)
GFR SERPL CREATININE-BSD FRML MDRD: 100 ML/MIN/1.73SQ M
GLUCOSE SERPL-MCNC: 93 MG/DL (ref 65–140)
HCT VFR BLD AUTO: 38.8 % (ref 34.8–46.1)
HGB BLD-MCNC: 12.4 G/DL (ref 11.5–15.4)
IMM GRANULOCYTES # BLD AUTO: 0.05 THOUSAND/UL (ref 0–0.2)
IMM GRANULOCYTES NFR BLD AUTO: 1 % (ref 0–2)
LYMPHOCYTES # BLD AUTO: 2.4 THOUSANDS/ΜL (ref 0.6–4.47)
LYMPHOCYTES NFR BLD AUTO: 26 % (ref 14–44)
MCH RBC QN AUTO: 28.4 PG (ref 26.8–34.3)
MCHC RBC AUTO-ENTMCNC: 32 G/DL (ref 31.4–37.4)
MCV RBC AUTO: 89 FL (ref 82–98)
MONOCYTES # BLD AUTO: 0.54 THOUSAND/ΜL (ref 0.17–1.22)
MONOCYTES NFR BLD AUTO: 6 % (ref 4–12)
NEUTROPHILS # BLD AUTO: 5.94 THOUSANDS/ΜL (ref 1.85–7.62)
NEUTS SEG NFR BLD AUTO: 66 % (ref 43–75)
NRBC BLD AUTO-RTO: 0 /100 WBCS
PLATELET # BLD AUTO: 282 THOUSANDS/UL (ref 149–390)
PMV BLD AUTO: 11 FL (ref 8.9–12.7)
POTASSIUM SERPL-SCNC: 3.9 MMOL/L (ref 3.5–5.3)
PROT SERPL-MCNC: 6.8 G/DL (ref 6.4–8.2)
RBC # BLD AUTO: 4.36 MILLION/UL (ref 3.81–5.12)
SODIUM SERPL-SCNC: 136 MMOL/L (ref 136–145)
TSH SERPL DL<=0.05 MIU/L-ACNC: 2 UIU/ML (ref 0.36–3.74)
WBC # BLD AUTO: 9.09 THOUSAND/UL (ref 4.31–10.16)

## 2018-08-28 PROCEDURE — 93306 TTE W/DOPPLER COMPLETE: CPT | Performed by: INTERNAL MEDICINE

## 2018-08-28 PROCEDURE — 36415 COLL VENOUS BLD VENIPUNCTURE: CPT

## 2018-08-28 PROCEDURE — 85025 COMPLETE CBC W/AUTO DIFF WBC: CPT

## 2018-08-28 PROCEDURE — 84443 ASSAY THYROID STIM HORMONE: CPT

## 2018-08-28 PROCEDURE — 80053 COMPREHEN METABOLIC PANEL: CPT

## 2018-08-28 PROCEDURE — 93306 TTE W/DOPPLER COMPLETE: CPT

## 2018-09-10 ENCOUNTER — TELEPHONE (OUTPATIENT)
Dept: INTERNAL MEDICINE CLINIC | Age: 48
End: 2018-09-10

## 2018-09-10 ENCOUNTER — OFFICE VISIT (OUTPATIENT)
Dept: INTERNAL MEDICINE CLINIC | Age: 48
End: 2018-09-10
Payer: COMMERCIAL

## 2018-09-10 VITALS
DIASTOLIC BLOOD PRESSURE: 64 MMHG | BODY MASS INDEX: 34.21 KG/M2 | SYSTOLIC BLOOD PRESSURE: 100 MMHG | HEIGHT: 61 IN | OXYGEN SATURATION: 97 % | HEART RATE: 75 BPM | WEIGHT: 181.2 LBS | TEMPERATURE: 98.8 F

## 2018-09-10 DIAGNOSIS — E66.9 OBESITY (BMI 30.0-34.9): Primary | ICD-10-CM

## 2018-09-10 DIAGNOSIS — R60.0 BILATERAL LEG EDEMA: ICD-10-CM

## 2018-09-10 PROBLEM — E66.811 OBESITY (BMI 30.0-34.9): Status: ACTIVE | Noted: 2018-09-10

## 2018-09-10 PROCEDURE — 3008F BODY MASS INDEX DOCD: CPT | Performed by: NURSE PRACTITIONER

## 2018-09-10 PROCEDURE — 99213 OFFICE O/P EST LOW 20 MIN: CPT | Performed by: NURSE PRACTITIONER

## 2018-09-10 NOTE — PROGRESS NOTES
Assessment/Plan:    Obesity:  Will start contrave  D/W pt side effects  Will gradually titrate up dosing  D/w pt the importance of low fat/low carb/high protein  Pt to attempt a 1500 calorie diet  D/w pt good fats/carbs and healthy protein as well  Reviewed with pt my fitness pal [employer] - and was downloaded to her phone at appt  D/w pt 30 mins of cardio 3x a week minimum  Pt to continue multivitamin  Reviewed labs  Pt to follow-up in 1 month  Diagnoses and all orders for this visit:    Obesity (BMI 30 0-34 9)  -     Naltrexone-Bupropion HCl ER 8-90 MG TB12; Take 1 tab PO every am x 1 week, then 1 tab twice a day x 1 week, then 2 tabs every am and 1 tab every PM x 1 week, then two tabs BID        Subjective:      Patient ID: Navin Chavez is a 53 y o  female  HPI     Obesity  Navin Chavez is a 53 y o  female who presents for evaluation of obesity  She has noted a gradual weight gain over the past few years  Previous treatments for obesity include:  None  Co-morbidities:  None  Cardiovascular risk factors besides obesity: none  No PMH of HTN, HLD  Pt states that LE edema is improved with elevation, low sodium diet  Denies [place]/CP  The following portions of the patient's history were reviewed and updated as appropriate: allergies, current medications, past family history, past medical history, past social history, past surgical history and problem list     The following portions of the patient's history were reviewed and updated as appropriate: allergies, current medications, past family history, past medical history, past social history, past surgical history and problem list     Review of Systems   Constitutional: Positive for unexpected weight change (gradual)  Negative for activity change, appetite change, chills and fever  Respiratory: Negative for chest tightness, shortness of breath and stridor           Exercise intolerance   Cardiovascular: Positive for leg swelling (improving)  Negative for chest pain and palpitations  Gastrointestinal: Negative for abdominal pain, constipation, diarrhea, nausea and vomiting  Musculoskeletal: Negative for arthralgias, joint swelling and neck pain  Neurological: Negative for dizziness, syncope, light-headedness and headaches  Psychiatric/Behavioral: Negative for dysphoric mood and sleep disturbance  The patient is not nervous/anxious  Past Medical History:   Diagnosis Date    Abnormal mammogram     Benign tumor of breast     left breast    Dysfunctional uterine bleeding     Resolved: 11/9/2017    Skin lesion of scalp     last assessed: 11/9/2017         Current Outpatient Prescriptions:     Multiple Vitamin (MULTIVITAMIN) tablet, Take 2 tablets by mouth daily  , Disp: , Rfl:     No Known Allergies    Social History   Past Surgical History:   Procedure Laterality Date    APPENDECTOMY      BREAST LUMPECTOMY Left     BREAST SURGERY Left 09/2012    bx    [place] COLONOSCOPY FLX DX W/COLLJ SPEC WHEN PFRMD N/A 7/20/2016    Procedure: COLONOSCOPY;  Surgeon: Familia Galloway MD;  [facility]; Service: Gastroenterology     Family History   Problem Relation Age of Onset    Cancer Father     Colon cancer Father     Liver cancer Father     Bone cancer Father     Breast cancer Mother     Lung cancer Mother     Liver cancer Mother        Objective:  /64 (BP Location: Left arm, Patient Position: Sitting, Cuff Size: Standard)   Pulse 75   Temp 98 8 °F (37 1 °C) (Tympanic)   Ht 5' 0 63" (1 54 m)   Wt 82 2 kg (181 lb 3 2 oz)   SpO2 97%   BMI 34 66 kg/m²      Physical Exam   Constitutional: She is oriented to person, place, and time  She appears well-developed and well-nourished  No distress  obese   Cardiovascular: Normal rate, regular rhythm and normal heart sounds  No murmur heard  Trace B/L LE edema   Pulmonary/Chest: Effort normal and breath sounds normal  No respiratory distress   She has no wheezes  Musculoskeletal: Normal range of motion  Neurological: She is alert and oriented to person, place, and time  Skin: Skin is warm and dry  No rash noted  No erythema  Psychiatric: She has a normal mood and affect  Her behavior is normal  Judgment and thought content normal    Nursing note and vitals reviewed

## 2018-09-10 NOTE — PATIENT INSTRUCTIONS
Will start contrave    Contact with side effects    Recommend 1500 calorie diet daily  Recommend low carb/low fat/high protein  Recommend 30 mins of cardio 3 times a week - fast walk, slow jog, yoga, exercise video, bike, join gym    Follow-up in 1 month

## 2018-09-10 NOTE — TELEPHONE ENCOUNTER
Patient called  She states that her insurance does not cover contrave, states over $300  Can we see if a PA is possible to approve? There are online coupon cards as well    THANKS!!

## 2018-09-11 NOTE — TELEPHONE ENCOUNTER
[address] does not cover any weight-loss medications  They consider them as not medically necessary  The patient would have to pay-out-of-pocket for them

## 2018-09-12 NOTE — TELEPHONE ENCOUNTER
Noted  Please inform pt of below  Inform her that I will be doing research on cost of other weight loss medications/coupon cards and will be in touch with her this week to review all options  cc'd myself as a reminder

## 2018-09-13 NOTE — TELEPHONE ENCOUNTER
Pt called  D/w her options  contrave $114/month with coupon card  Additional she could benefit from belviq with a $40 coupon card  D/w pt risk benefit of belviq and mechanism  rx sent to pharm on file for belviq  Pharm called and made aware contrave with d/c

## 2018-09-17 ENCOUNTER — TELEPHONE (OUTPATIENT)
Dept: INTERNAL MEDICINE CLINIC | Facility: CLINIC | Age: 48
End: 2018-09-17

## 2018-09-17 NOTE — TELEPHONE ENCOUNTER
Patient called and states [facility] is waiting on us for information to fill her Belviq  Advised patient that we have not received any information reg  preauth needed for medication  Patient states that the medication is not covered, advised her of the coupon card discussion she had with Rachelle on 9/13 and that we can only send the script in and the pharmacy has to process it with the coupon       Not sure what the pharmacy needs from us other than the script which was received 9/13/18 and patient states she has the information for coupon

## 2018-10-11 ENCOUNTER — OFFICE VISIT (OUTPATIENT)
Dept: INTERNAL MEDICINE CLINIC | Age: 48
End: 2018-10-11
Payer: COMMERCIAL

## 2018-10-11 VITALS
TEMPERATURE: 97.8 F | WEIGHT: 181.4 LBS | HEART RATE: 74 BPM | OXYGEN SATURATION: 96 % | SYSTOLIC BLOOD PRESSURE: 118 MMHG | BODY MASS INDEX: 34.25 KG/M2 | DIASTOLIC BLOOD PRESSURE: 64 MMHG | HEIGHT: 61 IN

## 2018-10-11 DIAGNOSIS — R21 RASH: ICD-10-CM

## 2018-10-11 DIAGNOSIS — E66.9 OBESITY (BMI 30.0-34.9): Primary | ICD-10-CM

## 2018-10-11 PROCEDURE — 99214 OFFICE O/P EST MOD 30 MIN: CPT | Performed by: NURSE PRACTITIONER

## 2018-10-11 RX ORDER — TRIAMCINOLONE ACETONIDE 1 MG/G
CREAM TOPICAL 2 TIMES DAILY
Qty: 45 G | Refills: 0 | Status: SHIPPED | OUTPATIENT
Start: 2018-10-11 | End: 2019-03-12

## 2018-10-11 NOTE — PATIENT INSTRUCTIONS
randy related to belviq as started 3 weeks ago  But will hold on belviq for now  Use triamcinalone cream twice a day  If no improvement call/mychart message myself  In 2 weeks if resolve sheri/mychart message and we can discuss restarting belviq  If rash returns if we restart stop and will refer to weight management

## 2018-10-11 NOTE — PROGRESS NOTES
Assessment/Plan:    Patient presents for one-month follow-up for obesity  She was initially started on Belviq  Patient has been on the medication for approximately three weeks  She has been doing fairly well up  She was experiencing some fatigue that has been worse since starting the medication otherwise no side effects  However two days ago patient developed a rash to bilateral forearms  Doubtful rash is related to starting Belviq is not a typical drug rest presentation and appears more related to a poison possible bug bite  Discussed with patient  She did stop the week approximately two days ago  Will continue holding on Belviq for two weeks until the rash resolves  Patient is to use triamcinolone cream for the rash  After the two week timeframe patient is to contact me and discuss restarting medication  Discussed with patient if we do restart medication and she fails will refer to weight loss center  Medications limited secondary to patient's insurance  Discussed with patient to continue low calorie, low carb, low-fat, high-protein diet  Continue to count calories  1600 calories recommended  Discussed with patient to increase her cardio  Pt declines influenza     Diagnoses and all orders for this visit:    Obesity (BMI 30 0-34  9)    Rash  -     triamcinolone (KENALOG) 0 1 % cream; Apply topically 2 (two) times a day        Subjective:      Patient ID: Shama Ray is a 53 y o  female  Obesity  Shama Ray is a 53 y o  female who presents for evaluation of obesity  She has noted a gradual weight gain over the past few years  Previous treatments for obesity include:  None  Co-morbidities:  None  Cardiovascular risk factors besides obesity: none  No PMH of HTN, HLD  Patient has been working on reducing her portions, increasing fruits and vegetables, limiting her pasta  She drinks primarily water and sugar free lemonade    She reports that her appetite has been decreased  She his additionally walking frequently at work  No additional exercise  Patient has not had a significant weight gain  She has the exact same weight that she was seen one month ago  She has not been weighing herself at home  Pt was started on belviq approx 3 weeks ago  Initially prescribed 9/13, but took time for coupon code/auth per pt  Overall patient has tolerated medication well  She has been experiencing some fatigue  Denies headaches, dizziness, nausea, vomiting, dry mouth  However patient did experience a rash that started approximately two days ago  Rash   This is a new problem  Episode onset: 2 days  Location: Bilateral forearms  The rash is characterized by redness and itchiness  Associated with: started belviq approx 3 weeks ago  No new detergents, soaps  Denies gardening, plant exposure, insect bites  Associated symptoms include fatigue  Pertinent negatives include no congestion, cough, diarrhea, fever, rhinorrhea, shortness of breath, sore throat or vomiting  Treatments tried: Over-the-counter Benadryl cream  The treatment provided no relief  The following portions of the patient's history were reviewed and updated as appropriate: allergies, current medications, past family history, past medical history, past social history, past surgical history and problem list     Review of Systems   Constitutional: Positive for appetite change (  Decreased) and fatigue  Negative for chills, fever and unexpected weight change  HENT: Negative for congestion, postnasal drip, rhinorrhea, sinus pain, sinus pressure and sore throat  Respiratory: Negative for cough, shortness of breath and wheezing  Cardiovascular: Negative for chest pain and palpitations  Gastrointestinal: Negative for constipation, diarrhea, nausea and vomiting  Skin: Positive for rash  Negative for wound  Neurological: Negative for dizziness, light-headedness, numbness and headaches  Psychiatric/Behavioral: Negative for dysphoric mood  The patient is not nervous/anxious  Past Medical History:   Diagnosis Date    Abnormal mammogram     Benign tumor of breast     left breast    Dysfunctional uterine bleeding     Resolved: 11/9/2017    Skin lesion of scalp     last assessed: 11/9/2017         Current Outpatient Prescriptions:     Lorcaserin HCl (BELVIQ) 10 MG TABS, Take 1 tablet (10 mg total) by mouth 2 (two) times a day, Disp: 60 tablet, Rfl: 0    Multiple Vitamin (MULTIVITAMIN) tablet, Take 2 tablets by mouth daily  , Disp: , Rfl:     No Known Allergies    Social History   Past Surgical History:   Procedure Laterality Date    APPENDECTOMY      BREAST LUMPECTOMY Left     BREAST SURGERY Left 09/2012    bx    [place] COLONOSCOPY FLX DX W/COLLJ SPEC WHEN PFRMD N/A 7/20/2016    Procedure: COLONOSCOPY;  Surgeon: Jayla Paige MD;  Location: [facility]; Service: Gastroenterology     Family History   Problem Relation Age of Onset    Cancer Father     Colon cancer Father     Liver cancer Father     Bone cancer Father     Breast cancer Mother     Lung cancer Mother     Liver cancer Mother        Objective:  /64 (BP Location: Left arm, Patient Position: Sitting, Cuff Size: Standard)   Pulse 74   Temp 97 8 °F (36 6 °C) (Tympanic)   Ht 5' 0 63" (1 54 m)   Wt 82 3 kg (181 lb 6 4 oz)   SpO2 96%   BMI 34 69 kg/m²      Physical Exam   Constitutional: She is oriented to person, place, and time  She appears well-developed and well-nourished  No distress  obese   Cardiovascular: Normal rate, regular rhythm and normal heart sounds  No murmur heard  No pedal edema   Pulmonary/Chest: Effort normal and breath sounds normal  No respiratory distress  She has no wheezes  Musculoskeletal: Normal range of motion  Neurological: She is alert and oriented to person, place, and time  Skin: Skin is warm and dry  Rash noted  See photo for full details    Patient with rash to bilateral forearms  Scattered slightly raised erythematous areas to B/L forearms  Appears small central vesicular area to some  No drainage  No rash elsewhere  Psychiatric: She has a normal mood and affect  Her behavior is normal  Judgment and thought content normal    Nursing note and vitals reviewed

## 2018-11-26 ENCOUNTER — HOSPITAL ENCOUNTER (OUTPATIENT)
Dept: RADIOLOGY | Facility: HOSPITAL | Age: 48
Discharge: HOME | End: 2018-11-26
Attending: OBSTETRICS & GYNECOLOGY
Payer: COMMERCIAL

## 2018-11-26 VITALS — WEIGHT: 181.44 LBS | BODY MASS INDEX: 34.26 KG/M2 | HEIGHT: 61 IN

## 2018-11-26 DIAGNOSIS — Z12.31 ENCOUNTER FOR SCREENING MAMMOGRAM FOR MALIGNANT NEOPLASM OF BREAST: ICD-10-CM

## 2018-11-26 PROCEDURE — 77067 SCR MAMMO BI INCL CAD: CPT

## 2019-01-02 ENCOUNTER — OFFICE VISIT (OUTPATIENT)
Dept: INTERNAL MEDICINE CLINIC | Age: 49
End: 2019-01-02
Payer: COMMERCIAL

## 2019-01-02 VITALS
DIASTOLIC BLOOD PRESSURE: 74 MMHG | WEIGHT: 176.4 LBS | BODY MASS INDEX: 33.3 KG/M2 | HEIGHT: 61 IN | OXYGEN SATURATION: 98 % | HEART RATE: 86 BPM | SYSTOLIC BLOOD PRESSURE: 100 MMHG | TEMPERATURE: 98 F

## 2019-01-02 DIAGNOSIS — J06.9 URTI (ACUTE UPPER RESPIRATORY INFECTION): Primary | ICD-10-CM

## 2019-01-02 DIAGNOSIS — R60.0 BILATERAL LEG EDEMA: ICD-10-CM

## 2019-01-02 PROCEDURE — 3008F BODY MASS INDEX DOCD: CPT | Performed by: INTERNAL MEDICINE

## 2019-01-02 PROCEDURE — 99213 OFFICE O/P EST LOW 20 MIN: CPT | Performed by: INTERNAL MEDICINE

## 2019-01-02 RX ORDER — AMOXICILLIN AND CLAVULANATE POTASSIUM 875; 125 MG/1; MG/1
1 TABLET, FILM COATED ORAL EVERY 12 HOURS SCHEDULED
Qty: 14 TABLET | Refills: 0 | Status: SHIPPED | OUTPATIENT
Start: 2019-01-02 | End: 2019-01-09

## 2019-01-02 RX ORDER — BENZONATATE 200 MG/1
200 CAPSULE ORAL 3 TIMES DAILY PRN
Qty: 30 CAPSULE | Refills: 0 | Status: SHIPPED | OUTPATIENT
Start: 2019-01-02 | End: 2019-03-12

## 2019-01-02 RX ORDER — FLUTICASONE PROPIONATE 50 MCG
1 SPRAY, SUSPENSION (ML) NASAL DAILY
Qty: 1 BOTTLE | Refills: 0 | Status: SHIPPED | OUTPATIENT
Start: 2019-01-02 | End: 2019-03-12

## 2019-01-02 NOTE — PROGRESS NOTES
Assessment/Plan:      Upper respiratory tract infection  - concerning for a bacterial upper respiratory tract infection  - will start patient on Augmentin 875-125 mg twice daily for 7 days  -will start patient on benzonatate for mostly on productive cough and she has been counseled to start taking over-the-counter Mucinex and stop the benzonatate if her cough becomes more productive  - will prescribe Flonase nasal spray for rhinitis  - patient has been counseled to wash her hands liberally, get plenty of rest and drink plenty of fluids    Bilateral leg edema  - chronic  -patient has been counseled to elevate her legs as much as she can  -she was counseled to follow up with her primary care physician for further workup if her symptoms continue     Diagnoses and all orders for this visit:    URTI (acute upper respiratory infection)  -     amoxicillin-clavulanate (AUGMENTIN) 875-125 mg per tablet; Take 1 tablet by mouth every 12 (twelve) hours for 7 days  -     benzonatate (TESSALON) 200 MG capsule; Take 1 capsule (200 mg total) by mouth 3 (three) times a day as needed for cough  -     fluticasone (FLONASE) 50 mcg/act nasal spray; 1 spray into each nostril daily    Bilateral leg edema            Subjective:      Patient ID: Oscar Gallegos is a 53 y o  female  HPI  Patient presents to complains of  cough for the past 3 days, cough is sometimes dry and sometimes productive of yellow phlegm  She also complains of runny nose and the discharge is usually greenish yellow with the occasional nosebleed  She admits to associated nasal congestion, hoarseness, sore throat, sinus pressure, and chest pain on coughing  She denies fever, chills, night sweats, wheezing, palpitations, nausea, vomiting, abdominal pain, diarrhea, constipation, myalgias, arthralgias  She has been taking DayQuil and NyQuil without improvement of her symptoms  She did not take the flu shot this season  She denies a history of contact    She does not smoke  She quit smoking years ago  The following portions of the patient's history were reviewed and updated as appropriate: allergies, current medications, past family history, past medical history, past social history, past surgical history and problem list     Review of Systems   Constitutional: Positive for fatigue  Negative for activity change, chills, fever and unexpected weight change  HENT: Positive for congestion, rhinorrhea, sinus pressure, sore throat and voice change  Negative for ear pain and postnasal drip  Eyes: Negative for pain  Respiratory: Positive for cough  Negative for choking, chest tightness, shortness of breath and wheezing  Cardiovascular: Positive for chest pain (on coughing)  Negative for palpitations and leg swelling  Gastrointestinal: Negative for abdominal pain, constipation, diarrhea, nausea and vomiting  Genitourinary: Negative for dysuria and hematuria  Musculoskeletal: Negative for arthralgias, back pain, gait problem, joint swelling, myalgias and neck stiffness  Skin: Negative for pallor and rash  Neurological: Positive for headaches  Negative for dizziness, tremors, seizures, syncope and light-headedness  Hematological: Negative for adenopathy  Psychiatric/Behavioral: Negative for behavioral problems  Past Medical History:   Diagnosis Date    Abnormal mammogram     Benign tumor of breast     left breast    Dysfunctional uterine bleeding     Resolved: 11/9/2017    Skin lesion of scalp     last assessed: 11/9/2017         Current Outpatient Prescriptions:     Multiple Vitamin (MULTIVITAMIN) tablet, Take 2 tablets by mouth daily    , Disp: , Rfl:     triamcinolone (KENALOG) 0 1 % cream, Apply topically 2 (two) times a day, Disp: 45 g, Rfl: 0    amoxicillin-clavulanate (AUGMENTIN) 875-125 mg per tablet, Take 1 tablet by mouth every 12 (twelve) hours for 7 days, Disp: 14 tablet, Rfl: 0    benzonatate (TESSALON) 200 MG capsule, Take 1 capsule (200 mg total) by mouth 3 (three) times a day as needed for cough, Disp: 30 capsule, Rfl: 0    fluticasone (FLONASE) 50 mcg/act nasal spray, 1 spray into each nostril daily, Disp: 1 Bottle, Rfl: 0    No Known Allergies    Social History   Past Surgical History:   Procedure Laterality Date    APPENDECTOMY      BREAST LUMPECTOMY Left     BREAST SURGERY Left 09/2012    bx    [place] COLONOSCOPY FLX DX W/COLLJ SPEC WHEN PFRMD N/A 7/20/2016    Procedure: COLONOSCOPY;  Surgeon: Yusra Wilson MD;  Location: [facility]; Service: Gastroenterology     Family History   Problem Relation Age of Onset    Cancer Father     Colon cancer Father 76    Liver cancer Father     Bone cancer Father     Breast cancer Mother 72    Lung cancer Mother     Liver cancer Mother     Breast cancer Paternal Aunt 67       Objective:  /74 (BP Location: Left arm, Patient Position: Sitting, Cuff Size: Large)   Pulse 86   Temp 98 °F (36 7 °C) (Oral)   Ht 5' 0 75" (1 543 m)   Wt 80 kg (176 lb 6 4 oz)   LMP 05/01/2018 (Approximate)   SpO2 98%   BMI 33 61 kg/m²        Physical Exam   Constitutional: She is oriented to person, place, and time  She appears well-developed and well-nourished  No distress  HENT:   Head: Normocephalic and atraumatic  Right Ear: External ear normal    Left Ear: External ear normal    Nose: Nose normal    Mouth/Throat: No oropharyngeal exudate  Nasal mucosa erythema and edema with dried blood in the left nasal cavity  Erythema of external auditory canal bilaterally but normal appearing tympanic membrane bilaterally  Mallampati class 4 oropharynx-unable to examine her oropharynx  Dry mucous membranes  Eyes: Pupils are equal, round, and reactive to light  Conjunctivae and EOM are normal  Right eye exhibits no discharge  Left eye exhibits no discharge  No scleral icterus  Neck: Normal range of motion  Neck supple  No JVD present  No tracheal deviation present  No thyromegaly present  Cardiovascular: Normal rate, regular rhythm, normal heart sounds and intact distal pulses  Exam reveals no gallop and no friction rub  No murmur heard  Pulmonary/Chest: Effort normal and breath sounds normal  No respiratory distress  She has no wheezes  She has no rales  She exhibits no tenderness  Abdominal: Soft  Bowel sounds are normal  She exhibits no distension and no mass  There is no tenderness  There is no rebound and no guarding  Musculoskeletal: Normal range of motion  She exhibits edema (2+ pitting pedal edema extending up to the upper shins bilaterally, left worse than right)  She exhibits no tenderness or deformity  Lymphadenopathy:     She has cervical adenopathy (Cervical and submandibular lymphadenopathy-large)  Neurological: She is alert and oriented to person, place, and time  She has normal reflexes  No cranial nerve deficit  She exhibits normal muscle tone  Coordination normal    Skin: Skin is warm and dry  No rash noted  She is not diaphoretic  No erythema  No pallor  Psychiatric: She has a normal mood and affect   Her behavior is normal

## 2019-01-02 NOTE — PATIENT INSTRUCTIONS
- Drink plenty of fluids  - Get plenty of rest  - You may use salt water gargles for your sore throat  - You may use over the counter tylenol or Ibuprofen (motrin or Advil) for any headache, muscle aches and fever  - Call the office if your symptoms persist beyond a total of 7-10 days      Upper Respiratory Infection   AMBULATORY CARE:   An upper respiratory infection  is also called a common cold  It can affect your nose, throat, ears, and sinuses  Common signs and symptoms include the following:  Cold symptoms are usually worst for the first 3 to 5 days  You may have any of the following:  · Runny or stuffy nose    · Sneezing and coughing    · Sore throat or hoarseness    · Red, watery, and sore eyes    · Fatigue     · Chills and fever    · Headache, body aches, or sore muscles  Seek care immediately if:   · You have chest pain or trouble breathing  Contact your healthcare provider if:   · You have a fever over 102ºF (39°C)  · Your sore throat gets worse or you see white or yellow spots in your throat  · Your symptoms get worse after 3 to 5 days or your cold is not better in 14 days  · You have a rash anywhere on your skin  · You have large, tender lumps in your neck  · You have thick, green or yellow drainage from your nose  · You cough up thick yellow, green, or bloody mucus  · You have vomiting for more than 24 hours and cannot keep fluids down  · You have a bad earache  · You have questions or concerns about your condition or care  Treatment for a cold: There is no cure for the common cold  Colds are caused by viruses and do not get better with antibiotics  Most people get better in 7 to 14 days  You may continue to cough for 2 to 3 weeks  The following may help decrease your symptoms:  · Decongestants  help reduce nasal congestion and help you breathe more easily  If you take decongestant pills, they may make you feel restless or not able to sleep   Do not use decongestant sprays for more than a few days  · Cough suppressants  help reduce coughing  Ask your healthcare provider which type of cough medicine is best for you  · NSAIDs , such as ibuprofen, help decrease swelling, pain, and fever  NSAIDs can cause stomach bleeding or kidney problems in certain people  If you take blood thinner medicine, always ask your healthcare provider if NSAIDs are safe for you  Always read the medicine label and follow directions  · Acetaminophen  decreases pain and fever  It is available without a doctor's order  Ask how much to take and how often to take it  Follow directions  Read the labels of all other medicines you are using to see if they also contain acetaminophen, or ask your doctor or pharmacist  Acetaminophen can cause liver damage if not taken correctly  Do not use more than 4 grams (4,000 milligrams) total of acetaminophen in one day  Manage your cold:   · Rest as much as possible  Slowly start to do more each day  · Drink more liquids as directed  Liquids will help thin and loosen mucus so you can cough it up  Liquids will also help prevent dehydration  Liquids that help prevent dehydration include water, fruit juice, and broth  Do not drink liquids that contain caffeine  Caffeine can increase your risk for dehydration  Ask your healthcare provider how much liquid to drink each day  · Soothe a sore throat  Gargle with warm salt water  This helps your sore throat feel better  Make salt water by dissolving ¼ teaspoon salt in 1 cup warm water  You may also suck on hard candy or throat lozenges  You may use a sore throat spray  · Use a humidifier or vaporizer  Use a cool mist humidifier or a vaporizer to increase air moisture in your home  This may make it easier for you to breathe and help decrease your cough  · Use saline nasal drops as directed  These help relieve congestion  · Apply petroleum-based jelly around the outside of your nostrils    This can decrease irritation from blowing your nose  · Do not smoke  Nicotine and other chemicals in cigarettes and cigars can make your symptoms worse  They can also cause infections such as bronchitis or pneumonia  Ask your healthcare provider for information if you currently smoke and need help to quit  E-cigarettes or smokeless tobacco still contain nicotine  Talk to your healthcare provider before you use these products  Prevent spreading your cold to others:   · Try to stay away from other people during the first 2 to 3 days of your cold when it is more easily spread  · Do not share food or drinks  · Do not share hand towels with household members  · Wash your hands often, especially after you blow your nose  Turn away from other people and cover your mouth and nose with a tissue when you sneeze or cough  Follow up with your healthcare provider as directed:  Write down your questions so you remember to ask them during your visits  © 2017 [employer] Information is for End User's use only and may not be sold, redistributed or otherwise used for commercial purposes  All illustrations and images included in CareNotes® are the copyrighted property of [employer]  or [employer]  The above information is an  only  It is not intended as medical advice for individual conditions or treatments  Talk to your doctor, nurse or pharmacist before following any medical regimen to see if it is safe and effective for you

## 2019-01-29 DIAGNOSIS — J06.9 URTI (ACUTE UPPER RESPIRATORY INFECTION): ICD-10-CM

## 2019-01-29 RX ORDER — FLUTICASONE PROPIONATE 50 MCG
SPRAY, SUSPENSION (ML) NASAL
Refills: 0 | OUTPATIENT
Start: 2019-01-29

## 2019-03-12 ENCOUNTER — ANNUAL EXAM (OUTPATIENT)
Dept: OBGYN CLINIC | Facility: CLINIC | Age: 49
End: 2019-03-12
Payer: COMMERCIAL

## 2019-03-12 VITALS
HEIGHT: 61 IN | WEIGHT: 185 LBS | BODY MASS INDEX: 34.93 KG/M2 | DIASTOLIC BLOOD PRESSURE: 70 MMHG | SYSTOLIC BLOOD PRESSURE: 108 MMHG

## 2019-03-12 DIAGNOSIS — N91.2 AMENORRHEA: ICD-10-CM

## 2019-03-12 DIAGNOSIS — Z12.31 ENCOUNTER FOR SCREENING MAMMOGRAM FOR MALIGNANT NEOPLASM OF BREAST: Primary | ICD-10-CM

## 2019-03-12 DIAGNOSIS — Z01.419 ENCOUNTER FOR GYNECOLOGICAL EXAMINATION (GENERAL) (ROUTINE) WITHOUT ABNORMAL FINDINGS: ICD-10-CM

## 2019-03-12 PROCEDURE — G0145 SCR C/V CYTO,THINLAYER,RESCR: HCPCS | Performed by: OBSTETRICS & GYNECOLOGY

## 2019-03-12 PROCEDURE — 99396 PREV VISIT EST AGE 40-64: CPT | Performed by: OBSTETRICS & GYNECOLOGY

## 2019-03-12 NOTE — PATIENT INSTRUCTIONS
This 67-year-old patient was told that her breast and pelvic exam are normal  I did order a pelvic ultrasound to re-evaluate her endometrial stripe and ovaries

## 2019-03-12 NOTE — PROGRESS NOTES
Assessment/Plan: This 52-year-old patient is seen for annual gyn evaluation  She has had no menses on her own since about February of a year ago  No problem-specific Assessment & Plan notes found for this encounter  Subjective:      Patient ID: Darien Paniagua is a 53 y o  female  This 52-year-old patient has had no period on her own since about February of 2018  She was seen May 14, 2018 and given a prescription of progesterone which did trigger a menses  Since then she has had no further periods a pelvic ultrasound May 16, 2018 shows 2 small uterine fibroids and 9 mm endometrial stripe  Her ovaries were normal  Estradiol was 129, FSH was 16 3,   She has occasional mild hot flashes but no chronic headaches or fainting spells  She does not use a lubricant with intercourse and is comfortable  Her bowel and bladder function are normal  Occasionally she might see light bleeding with her bowel movements but does have a colonoscopy scheduled coming up  Her blood pressure is 108/70 and her weight is 185 lb which is 15 lb more than a year ago  Review of Systems   Constitutional: Negative  HENT: Negative  Eyes: Negative  Respiratory: Negative  Cardiovascular: Negative  Gastrointestinal: Negative  Endocrine: Negative  Genitourinary: Negative  Musculoskeletal: Negative  Skin: Negative  Allergic/Immunologic: Negative  Neurological: Negative  Hematological: Negative  Psychiatric/Behavioral: Negative  Objective:      /70 (BP Location: Left arm, Patient Position: Sitting, Cuff Size: Large)   Ht 5' 1" (1 549 m)   Wt 83 9 kg (185 lb)   LMP 05/01/2018 (Approximate)   BMI 34 96 kg/m²          Physical Exam   Constitutional: She is oriented to person, place, and time  She appears well-developed and well-nourished  HENT:   Head: Normocephalic  Neck: Normal range of motion  Neck supple     Cardiovascular: Normal rate, regular rhythm, normal heart sounds and intact distal pulses  Pulmonary/Chest: Effort normal and breath sounds normal    Abdominal: Soft  Bowel sounds are normal    Genitourinary: Uterus normal    Musculoskeletal: Normal range of motion  Neurological: She is alert and oriented to person, place, and time  Skin: Skin is warm and dry  Psychiatric: She has a normal mood and affect  Nursing note and vitals reviewed  uterus is anterior mobile normal size and well supported  No adnexal masses are identified  The cervix is normal to appearance  There is no blood or abnormal discharge in the vagina  There is normal moisture in the vaginal epithelium and the vaginal skin looks normal   The vulva is normal  Rectal exam shows no masses or blood in the rectum no nodularity in the cul-de-sac

## 2019-03-15 LAB
LAB AP GYN PRIMARY INTERPRETATION: NORMAL
Lab: NORMAL

## 2019-04-17 ENCOUNTER — HOSPITAL ENCOUNTER (OUTPATIENT)
Dept: RADIOLOGY | Facility: HOSPITAL | Age: 49
Discharge: HOME | End: 2019-04-17
Attending: OBSTETRICS & GYNECOLOGY
Payer: COMMERCIAL

## 2019-04-17 DIAGNOSIS — N91.2 AMENORRHEA: ICD-10-CM

## 2019-04-17 PROCEDURE — 76856 US EXAM PELVIC COMPLETE: CPT

## 2019-04-17 PROCEDURE — 76830 TRANSVAGINAL US NON-OB: CPT

## 2019-04-18 ENCOUNTER — TELEPHONE (OUTPATIENT)
Dept: OBGYN CLINIC | Facility: CLINIC | Age: 49
End: 2019-04-18

## 2019-04-22 ENCOUNTER — TELEPHONE (OUTPATIENT)
Dept: OBGYN CLINIC | Facility: CLINIC | Age: 49
End: 2019-04-22

## 2019-04-22 DIAGNOSIS — N83.202 LEFT OVARIAN CYST: Primary | ICD-10-CM

## 2019-10-23 ENCOUNTER — HOSPITAL ENCOUNTER (OUTPATIENT)
Dept: RADIOLOGY | Facility: HOSPITAL | Age: 49
Discharge: HOME | End: 2019-10-23
Attending: OBSTETRICS & GYNECOLOGY
Payer: COMMERCIAL

## 2019-10-23 DIAGNOSIS — N83.202 LEFT OVARIAN CYST: ICD-10-CM

## 2019-10-23 PROCEDURE — 76830 TRANSVAGINAL US NON-OB: CPT

## 2019-10-23 PROCEDURE — 76856 US EXAM PELVIC COMPLETE: CPT

## 2019-10-28 ENCOUNTER — TELEPHONE (OUTPATIENT)
Dept: OBGYN CLINIC | Facility: CLINIC | Age: 49
End: 2019-10-28

## 2019-10-28 NOTE — TELEPHONE ENCOUNTER
[facility] radiology called in regards to significant findings on pelvic u/s please advise

## 2019-10-29 ENCOUNTER — TELEPHONE (OUTPATIENT)
Dept: OBGYN CLINIC | Facility: CLINIC | Age: 49
End: 2019-10-29

## 2019-10-29 NOTE — TELEPHONE ENCOUNTER
----- Message from Gee Nesbitt MD sent at 10/28/2019  2:50 PM EDT -----  Please call the patient regarding her pelvic ultrasound results  Pelvic ultrasound does show a small left to benign appearing ovarian cyst and uterine fibroids  The lining of the uterus is 9 mm thick which is consistent with  not being in menopause  Sampling the lining of the uterus may help to determine if this tissue is normal based on her unusual menstrual history  See if we can schedule her for an endometrial biopsy

## 2019-10-29 NOTE — TELEPHONE ENCOUNTER
Spoke with pt - she is aware of her results and that [id number] would like her to schedule an EMB - appt scheduled for 11/04  Recommended she take 2-3 Ibuprofen an hour before the appt

## 2019-11-04 ENCOUNTER — PROCEDURE VISIT (OUTPATIENT)
Dept: OBGYN CLINIC | Facility: CLINIC | Age: 49
End: 2019-11-04
Payer: COMMERCIAL

## 2019-11-04 VITALS — WEIGHT: 189 LBS | SYSTOLIC BLOOD PRESSURE: 118 MMHG | BODY MASS INDEX: 35.71 KG/M2 | DIASTOLIC BLOOD PRESSURE: 70 MMHG

## 2019-11-04 DIAGNOSIS — R93.89 THICKENED ENDOMETRIUM: Primary | ICD-10-CM

## 2019-11-04 DIAGNOSIS — N85.00 ENDOMETRIAL HYPERPLASIA: ICD-10-CM

## 2019-11-04 PROCEDURE — 88305 TISSUE EXAM BY PATHOLOGIST: CPT | Performed by: PATHOLOGY

## 2019-11-04 PROCEDURE — 58100 BIOPSY OF UTERUS LINING: CPT | Performed by: OBSTETRICS & GYNECOLOGY

## 2019-11-04 NOTE — PATIENT INSTRUCTIONS
Biopsy was performed today due to on menorrhea with a 9 mm endometrial stripe  She has had increasing hot flashes recently  She has had no significant bleeding vaginally for about a year

## 2019-11-04 NOTE — PROGRESS NOTES
Procedures this 45-year-old patient has had no vaginal bleeding for about 1 year  She did have an ultrasound May 16, 2018 which showed a 9 mm endometrial stripe and 2 small uterine fibroids  Previous thyroid FSH and estradiol tests have shown normal results  She has had increasing hot flashes recently  She was placed on the examining table in the dorsal lithotomy position  A speculum was placed in the vagina and the cervix identified  Anterior lip of the cervix was grasped with a tenaculum and the external cervical os was found to be stenotic  It was probed with a cervical dilator and did dilate adequately  The Pipelle was inserted into the uterine cavity which measured about 7 cm  The Pipelle was circulated around the cavity to obtain tissue from all dissectors  Was removed  Tissue was found in the uterine cavity  There was some bleeding afterwards  Patient was discharged in good condition

## 2019-11-11 ENCOUNTER — TELEPHONE (OUTPATIENT)
Dept: OBGYN CLINIC | Facility: CLINIC | Age: 49
End: 2019-11-11

## 2019-11-11 NOTE — TELEPHONE ENCOUNTER
Spoke with Pt today via phone call  Pt informed that her recent endometrial biopsy result showed inactive endometrium with no evidence of malignancy (benign), no further tests were indicated based on said endometrial biopsy report per Dr Latasha Lester review of report

## 2019-11-11 NOTE — TELEPHONE ENCOUNTER
----- Message from Marcellus Batres MD sent at 11/11/2019 12:56 PM EST -----  Please call the patient regarding her  endometrial biopsy  Endometrial biopsy  Her tissue report showed inactive endometrium with no evidence of malignancy  No further tests are indicated based on this tissue report

## 2020-01-13 ENCOUNTER — HOSPITAL ENCOUNTER (OUTPATIENT)
Dept: RADIOLOGY | Facility: HOSPITAL | Age: 50
Discharge: HOME | End: 2020-01-13
Attending: OBSTETRICS & GYNECOLOGY
Payer: COMMERCIAL

## 2020-01-13 VITALS — WEIGHT: 189 LBS | BODY MASS INDEX: 35.68 KG/M2 | HEIGHT: 61 IN

## 2020-01-13 DIAGNOSIS — Z12.31 ENCOUNTER FOR SCREENING MAMMOGRAM FOR MALIGNANT NEOPLASM OF BREAST: ICD-10-CM

## 2020-01-13 PROCEDURE — 77067 SCR MAMMO BI INCL CAD: CPT

## 2020-02-17 ENCOUNTER — TELEPHONE (OUTPATIENT)
Dept: INTERNAL MEDICINE CLINIC | Age: 50
End: 2020-02-17

## 2020-03-10 ENCOUNTER — OFFICE VISIT (OUTPATIENT)
Dept: INTERNAL MEDICINE CLINIC | Age: 50
End: 2020-03-10
Payer: COMMERCIAL

## 2020-03-10 VITALS
OXYGEN SATURATION: 98 % | SYSTOLIC BLOOD PRESSURE: 114 MMHG | DIASTOLIC BLOOD PRESSURE: 60 MMHG | HEART RATE: 80 BPM | BODY MASS INDEX: 36.25 KG/M2 | WEIGHT: 192 LBS | TEMPERATURE: 98 F | HEIGHT: 61 IN

## 2020-03-10 DIAGNOSIS — M79.672 HEEL PAIN, BILATERAL: ICD-10-CM

## 2020-03-10 DIAGNOSIS — Z23 NEED FOR INFLUENZA VACCINATION: ICD-10-CM

## 2020-03-10 DIAGNOSIS — Z00.00 HEALTH MAINTENANCE EXAMINATION: Primary | ICD-10-CM

## 2020-03-10 DIAGNOSIS — Z13.220 SCREENING FOR HYPERLIPIDEMIA: ICD-10-CM

## 2020-03-10 DIAGNOSIS — E66.09 CLASS 2 OBESITY DUE TO EXCESS CALORIES WITHOUT SERIOUS COMORBIDITY WITH BODY MASS INDEX (BMI) OF 36.0 TO 36.9 IN ADULT: ICD-10-CM

## 2020-03-10 DIAGNOSIS — Z13.228 SCREENING FOR METABOLIC DISORDER: ICD-10-CM

## 2020-03-10 DIAGNOSIS — M79.671 HEEL PAIN, BILATERAL: ICD-10-CM

## 2020-03-10 DIAGNOSIS — Z13.29 SCREENING FOR THYROID DISORDER: ICD-10-CM

## 2020-03-10 PROBLEM — E66.812 CLASS 2 OBESITY DUE TO EXCESS CALORIES WITHOUT SERIOUS COMORBIDITY WITH BODY MASS INDEX (BMI) OF 36.0 TO 36.9 IN ADULT: Status: ACTIVE | Noted: 2018-09-10

## 2020-03-10 PROCEDURE — 1036F TOBACCO NON-USER: CPT | Performed by: NURSE PRACTITIONER

## 2020-03-10 PROCEDURE — 3008F BODY MASS INDEX DOCD: CPT | Performed by: NURSE PRACTITIONER

## 2020-03-10 PROCEDURE — 99214 OFFICE O/P EST MOD 30 MIN: CPT | Performed by: NURSE PRACTITIONER

## 2020-03-10 PROCEDURE — 90686 IIV4 VACC NO PRSV 0.5 ML IM: CPT | Performed by: NURSE PRACTITIONER

## 2020-03-10 PROCEDURE — 90471 IMMUNIZATION ADMIN: CPT | Performed by: NURSE PRACTITIONER

## 2020-03-10 PROCEDURE — 99396 PREV VISIT EST AGE 40-64: CPT | Performed by: NURSE PRACTITIONER
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
## 2020-03-10 NOTE — PROGRESS NOTES
Assessment/Plan:    HM  - PAP:  UTD - 03/2019 followed by GYN  - Mammo:  UTD 01/2020  - Colonoscopy: UTD 07/2016    - influenza given today  D/w pt earlier next year as will be more effective  Good hand washing discussed    - encouraged better diet and exercise routinely     - will update labs - I will call her with results     Diagnoses and all orders for this visit:    Health maintenance examination    Screening for hyperlipidemia  -     Lipid panel; Future    Screening for metabolic disorder  -     CBC and differential; Future  -     Comprehensive metabolic panel; Future    Screening for thyroid disorder  -     TSH, 3rd generation with Free T4 reflex; Future    Class 2 obesity due to excess calories without serious comorbidity with body mass index (BMI) of 36 0 to 36 9 in adult  -     Ambulatory referral to Weight Management; Future    Need for influenza vaccination  -     Cancel: influenza vaccine, [id number], quadrivalent, recombinant, PF, 0 5 mL, for patients 18 yr+ (FLUBLOK)        Subjective:      Patient ID: Ariella Dolan is a 51 y o  female and presents today for Health Maintenance Physical     Last Physical: > 1 year  Last GYN Exam:  03/2019    Pt reports overall health:  good    Healthy Diet:  Room for improvement  Lunch:  Patient reports often take out Monday through Friday  She will often have pizza, chicken fingers, [employer] fries occasionally solid but not often  Sometimes damage with white bread  For dinner is typically red meat-steak or ground hamburger  Limited chicken and seafood  Often potatoes, rice and pasta  Limited vegetables  Drink: Ice tea, soda, fruit punch    Routine Exercise: Active  Aims for 10K steps a day  No formal exercise  Weight Concerns:  Wants to loose weight    Problems with vision:  Wears glasses    No concerns  Last Eye Exam:  Early 2019    Problems with Hearing:  no    Routine Dental Exams:  yes    Smoking History:  none  ETOH Use:  Socially  Illegal Drug Use:  no  Caffeine Use:  2 cans of soda daily    Last PAP:  03/2019 - followed by GYN    Last Mammo: 01/2020    Last colonoscopy:  2016    Family history of breast CA: mom and paternal aunt  Family History of Colon CA:  dad @ age 76 - diagnosed  Immunizations:  Wants influenza now    Last Labs:  Due for    The following portions of the patient's history were reviewed and updated as appropriate: allergies, current medications, past family history, past medical history, past social history, past surgical history and problem list     Review of Systems   Constitutional: Negative for activity change, appetite change, chills, fatigue, fever and unexpected weight change  HENT: Negative for congestion, ear pain, postnasal drip, sinus pressure, sinus pain and sore throat  Respiratory: Negative for cough, chest tightness, shortness of breath and wheezing  Cardiovascular: Negative for chest pain, palpitations and leg swelling  Gastrointestinal: Negative for abdominal pain, blood in stool, constipation, diarrhea, nausea and vomiting  Genitourinary: Negative for dysuria, frequency, urgency and vaginal bleeding  Musculoskeletal: Positive for arthralgias  Negative for back pain and gait problem  Neurological: Negative for dizziness, light-headedness, numbness and headaches  Psychiatric/Behavioral: Positive for dysphoric mood  Negative for sleep disturbance  The patient is not nervous/anxious  Past Medical History:   Diagnosis Date    Abnormal mammogram     Benign tumor of breast     left breast    Dysfunctional uterine bleeding     Resolved: 11/9/2017    Skin lesion of scalp     last assessed: 11/9/2017         Current Outpatient Medications:     Multiple Vitamin (MULTIVITAMIN) tablet, Take 2 tablets by mouth daily    , Disp: , Rfl:     No Known Allergies    Social History   Past Surgical History:   Procedure Laterality Date    APPENDECTOMY      BREAST LUMPECTOMY Left     BREAST SURGERY Left 09/2012    bx    MAMMO STEREOTACTIC BREAST BIOPSY LEFT (ALL INC) Left 09/25/2012    RI COLONOSCOPY FLX DX W/COLLJ SPEC WHEN PFRMD N/A 7/20/2016    Procedure: COLONOSCOPY;  Surgeon: Desiree Proctor MD;  Location:  [facility]; Service: Gastroenterology     Family History   Problem Relation Age of Onset    Cancer Father     Colon cancer Father 76    Liver cancer Father     Bone cancer Father     Breast cancer Mother 72    Lung cancer Mother     Liver cancer Mother     Breast cancer Paternal Aunt 67    No Known Problems Maternal Grandmother     No Known Problems Maternal Grandfather     No Known Problems Paternal Grandmother     No Known Problems Paternal Grandfather     No Known Problems Brother     No Known Problems Maternal Aunt        Objective:  /60 (BP Location: Left arm, Patient Position: Sitting, Cuff Size: Standard)   Pulse 80   Temp 98 °F (36 7 °C) (Tympanic)   Ht 5' 0 83" (1 545 m) Comment: shoes off  Wt 87 1 kg (192 lb) Comment: shoes off  LMP 05/01/2018 (Approximate)   SpO2 98%   BMI 36 48 kg/m²      Physical Exam   Constitutional: She is oriented to person, place, and time  She appears well-developed and well-nourished  No distress  HENT:   Head: Normocephalic and atraumatic  Right Ear: External ear normal    Left Ear: External ear normal    Nose: Nose normal    Mouth/Throat: Oropharynx is clear and moist  No oropharyngeal exudate  Neck: No thyromegaly present  Cardiovascular: Normal rate, regular rhythm and normal heart sounds  No murmur heard  No pedal edema   Pulmonary/Chest: Effort normal and breath sounds normal  No respiratory distress  She has no wheezes  Lymphadenopathy:     She has no cervical adenopathy  Neurological: She is alert and oriented to person, place, and time  No focal deficits   Skin: Skin is warm and dry  No rash noted  Psychiatric: She has a normal mood and affect   Her behavior is normal  Judgment and thought content normal    Nursing note and vitals reviewed

## 2020-03-10 NOTE — PATIENT INSTRUCTIONS
Get blood work done      I will call you with results    Recommend well rounded diet  Exercise 30 mins at least 3 times a week    Work on reducing "hidden sugars" - drinks - tea, soda, fruit punch  Limit pasta, rice, potatoes  Increase vegetables  Try and eat more chicken/seafood - limit red meat    Keep follow-up with GYN

## 2020-03-13 ENCOUNTER — APPOINTMENT (OUTPATIENT)
Dept: LAB | Age: 50
End: 2020-03-13
Payer: COMMERCIAL

## 2020-03-13 DIAGNOSIS — Z13.228 SCREENING FOR METABOLIC DISORDER: ICD-10-CM

## 2020-03-13 DIAGNOSIS — Z13.29 SCREENING FOR THYROID DISORDER: ICD-10-CM

## 2020-03-13 DIAGNOSIS — Z13.220 SCREENING FOR HYPERLIPIDEMIA: ICD-10-CM

## 2020-03-13 LAB
ALBUMIN SERPL BCP-MCNC: 3.5 G/DL (ref 3.5–5)
ALP SERPL-CCNC: 110 U/L (ref 46–116)
ALT SERPL W P-5'-P-CCNC: 64 U/L (ref 12–78)
ANION GAP SERPL CALCULATED.3IONS-SCNC: 6 MMOL/L (ref 4–13)
AST SERPL W P-5'-P-CCNC: 34 U/L (ref 5–45)
BASOPHILS # BLD AUTO: 0.06 THOUSANDS/ΜL (ref 0–0.1)
BASOPHILS NFR BLD AUTO: 1 % (ref 0–1)
BILIRUB SERPL-MCNC: 0.41 MG/DL (ref 0.2–1)
BUN SERPL-MCNC: 16 MG/DL (ref 5–25)
CALCIUM SERPL-MCNC: 9 MG/DL (ref 8.3–10.1)
CHLORIDE SERPL-SCNC: 106 MMOL/L (ref 100–108)
CHOLEST SERPL-MCNC: 184 MG/DL (ref 50–200)
CO2 SERPL-SCNC: 28 MMOL/L (ref 21–32)
CREAT SERPL-MCNC: 0.71 MG/DL (ref 0.6–1.3)
EOSINOPHIL # BLD AUTO: 0.2 THOUSAND/ΜL (ref 0–0.61)
EOSINOPHIL NFR BLD AUTO: 2 % (ref 0–6)
ERYTHROCYTE [DISTWIDTH] IN BLOOD BY AUTOMATED COUNT: 12.4 % (ref 11.6–15.1)
GFR SERPL CREATININE-BSD FRML MDRD: 101 ML/MIN/1.73SQ M
GLUCOSE P FAST SERPL-MCNC: 85 MG/DL (ref 65–99)
HCT VFR BLD AUTO: 42 % (ref 34.8–46.1)
HDLC SERPL-MCNC: 41 MG/DL
HGB BLD-MCNC: 13.5 G/DL (ref 11.5–15.4)
IMM GRANULOCYTES # BLD AUTO: 0.03 THOUSAND/UL (ref 0–0.2)
IMM GRANULOCYTES NFR BLD AUTO: 0 % (ref 0–2)
LDLC SERPL CALC-MCNC: 121 MG/DL (ref 0–100)
LYMPHOCYTES # BLD AUTO: 2.36 THOUSANDS/ΜL (ref 0.6–4.47)
LYMPHOCYTES NFR BLD AUTO: 27 % (ref 14–44)
MCH RBC QN AUTO: 28.8 PG (ref 26.8–34.3)
MCHC RBC AUTO-ENTMCNC: 32.1 G/DL (ref 31.4–37.4)
MCV RBC AUTO: 90 FL (ref 82–98)
MONOCYTES # BLD AUTO: 0.57 THOUSAND/ΜL (ref 0.17–1.22)
MONOCYTES NFR BLD AUTO: 7 % (ref 4–12)
NEUTROPHILS # BLD AUTO: 5.46 THOUSANDS/ΜL (ref 1.85–7.62)
NEUTS SEG NFR BLD AUTO: 63 % (ref 43–75)
NONHDLC SERPL-MCNC: 143 MG/DL
NRBC BLD AUTO-RTO: 0 /100 WBCS
PLATELET # BLD AUTO: 259 THOUSANDS/UL (ref 149–390)
PMV BLD AUTO: 10.6 FL (ref 8.9–12.7)
POTASSIUM SERPL-SCNC: 4.1 MMOL/L (ref 3.5–5.3)
PROT SERPL-MCNC: 7 G/DL (ref 6.4–8.2)
RBC # BLD AUTO: 4.68 MILLION/UL (ref 3.81–5.12)
SODIUM SERPL-SCNC: 140 MMOL/L (ref 136–145)
TRIGL SERPL-MCNC: 108 MG/DL
TSH SERPL DL<=0.05 MIU/L-ACNC: 1.76 UIU/ML (ref 0.36–3.74)
WBC # BLD AUTO: 8.68 THOUSAND/UL (ref 4.31–10.16)

## 2020-03-13 PROCEDURE — 80053 COMPREHEN METABOLIC PANEL: CPT

## 2020-03-13 PROCEDURE — 84443 ASSAY THYROID STIM HORMONE: CPT

## 2020-03-13 PROCEDURE — 36415 COLL VENOUS BLD VENIPUNCTURE: CPT

## 2020-03-13 PROCEDURE — 80061 LIPID PANEL: CPT

## 2020-03-13 PROCEDURE — 85025 COMPLETE CBC W/AUTO DIFF WBC: CPT

## 2020-03-16 ENCOUNTER — TELEPHONE (OUTPATIENT)
Dept: INTERNAL MEDICINE CLINIC | Facility: CLINIC | Age: 50
End: 2020-03-16

## 2020-03-16 ENCOUNTER — ANNUAL EXAM (OUTPATIENT)
Dept: OBGYN CLINIC | Facility: CLINIC | Age: 50
End: 2020-03-16
Payer: COMMERCIAL

## 2020-03-16 VITALS — SYSTOLIC BLOOD PRESSURE: 110 MMHG | WEIGHT: 193 LBS | BODY MASS INDEX: 36.67 KG/M2 | DIASTOLIC BLOOD PRESSURE: 72 MMHG

## 2020-03-16 DIAGNOSIS — Z11.51 SCREENING FOR HPV (HUMAN PAPILLOMAVIRUS): ICD-10-CM

## 2020-03-16 DIAGNOSIS — Z12.31 ENCOUNTER FOR SCREENING MAMMOGRAM FOR MALIGNANT NEOPLASM OF BREAST: ICD-10-CM

## 2020-03-16 DIAGNOSIS — N95.0 POSTMENOPAUSAL BLEEDING: ICD-10-CM

## 2020-03-16 DIAGNOSIS — Z01.419 ENCOUNTER FOR GYNECOLOGICAL EXAMINATION (GENERAL) (ROUTINE) WITHOUT ABNORMAL FINDINGS: Primary | ICD-10-CM

## 2020-03-16 DIAGNOSIS — Z12.4 ENCOUNTER FOR PAPANICOLAOU SMEAR FOR CERVICAL CANCER SCREENING: ICD-10-CM

## 2020-03-16 PROCEDURE — G0145 SCR C/V CYTO,THINLAYER,RESCR: HCPCS | Performed by: NURSE PRACTITIONER

## 2020-03-16 PROCEDURE — S0612 ANNUAL GYNECOLOGICAL EXAMINA: HCPCS | Performed by: NURSE PRACTITIONER

## 2020-03-16 PROCEDURE — 87624 HPV HI-RISK TYP POOLED RSLT: CPT | Performed by: NURSE PRACTITIONER

## 2020-03-16 NOTE — ASSESSMENT & PLAN NOTE
Light bleeding x3d occurred in 1/2020  In 11/2019 she had EMB for  bleeding with benign results  Exam today is unremarkable  Recommended repeating pelvic US and will advise further with results  Discussed D&C versus hysterectomy - will refer to a surgeon colleague as indicated

## 2020-03-16 NOTE — ASSESSMENT & PLAN NOTE
Benign findings on routine gyn exam  Recommended monthly SBE, annual CBE and annual screening mammo  ASCCP guidelines reviewed and pap with cotesting was collected today  Colonoscopy noted to be up to date; repeat q5 yrs per pt  The patient denies STI risk factors and declines testing at this time  Reviewed diet/activity recommendations:  Encouraged daily Ca++ and vitamin D intake as well as daily weight bearing exercise for promotion of bone health    Discussed postmenopausal considerations and symptoms to report  RTO in one year for routine annual gyn exam or sooner PRN

## 2020-03-18 LAB
HPV HR 12 DNA CVX QL NAA+PROBE: NEGATIVE
HPV16 DNA CVX QL NAA+PROBE: NEGATIVE
HPV18 DNA CVX QL NAA+PROBE: NEGATIVE

## 2020-03-19 LAB
LAB AP GYN PRIMARY INTERPRETATION: NORMAL
Lab: NORMAL

## 2020-04-14 ENCOUNTER — OFFICE VISIT (OUTPATIENT)
Dept: BARIATRICS | Facility: CLINIC | Age: 50
End: 2020-04-14

## 2020-04-14 DIAGNOSIS — E66.09 CLASS 2 OBESITY DUE TO EXCESS CALORIES WITHOUT SERIOUS COMORBIDITY WITH BODY MASS INDEX (BMI) OF 36.0 TO 36.9 IN ADULT: ICD-10-CM

## 2020-04-14 DIAGNOSIS — R63.5 ABNORMAL WEIGHT GAIN: ICD-10-CM

## 2020-04-14 PROCEDURE — WMDI30

## 2020-04-14 PROCEDURE — RECHECK

## 2020-06-05 ENCOUNTER — HOSPITAL ENCOUNTER (OUTPATIENT)
Dept: RADIOLOGY | Facility: HOSPITAL | Age: 50
Discharge: HOME | End: 2020-06-05
Payer: COMMERCIAL

## 2020-06-05 DIAGNOSIS — N95.0 POSTMENOPAUSAL BLEEDING: ICD-10-CM

## 2020-06-05 PROCEDURE — 76830 TRANSVAGINAL US NON-OB: CPT

## 2020-06-05 PROCEDURE — 76856 US EXAM PELVIC COMPLETE: CPT

## 2020-06-12 ENCOUNTER — TELEPHONE (OUTPATIENT)
Dept: OBGYN CLINIC | Facility: CLINIC | Age: 50
End: 2020-06-12

## 2020-06-12 DIAGNOSIS — N83.202 CYST OF LEFT OVARY: Primary | ICD-10-CM

## 2021-03-19 ENCOUNTER — ANNUAL EXAM (OUTPATIENT)
Dept: OBGYN CLINIC | Facility: CLINIC | Age: 51
End: 2021-03-19
Payer: COMMERCIAL

## 2021-03-19 VITALS — BODY MASS INDEX: 35.72 KG/M2 | DIASTOLIC BLOOD PRESSURE: 74 MMHG | SYSTOLIC BLOOD PRESSURE: 122 MMHG | WEIGHT: 188 LBS

## 2021-03-19 DIAGNOSIS — Z01.419 ENCOUNTER FOR GYNECOLOGICAL EXAMINATION (GENERAL) (ROUTINE) WITHOUT ABNORMAL FINDINGS: Primary | ICD-10-CM

## 2021-03-19 DIAGNOSIS — Z12.31 ENCOUNTER FOR SCREENING MAMMOGRAM FOR MALIGNANT NEOPLASM OF BREAST: ICD-10-CM

## 2021-03-19 PROCEDURE — S0612 ANNUAL GYNECOLOGICAL EXAMINA: HCPCS | Performed by: NURSE PRACTITIONER

## 2021-03-24 NOTE — PROGRESS NOTES
Assessment/Plan:    Encounter for gynecological examination (general) (routine) without abnormal findings  Benign findings on routine gyn exam  Recommended monthly SBE, annual CBE and annual screening mammo  ASCCP guidelines reviewed and pap with cotesting noted to be up to date  Colonoscopy noted to be up to date (2016)  The patient denies STI risk factors and declines testing at this time  Reviewed diet/activity recommendations:  Encouraged daily Ca++ and vitamin D intake as well as daily weight bearing exercise for promotion of bone health    Discussed postmenopausal considerations and symptoms to report  RTO in one year for routine annual gyn exam or sooner PRN  Diagnoses and all orders for this visit:    Encounter for gynecological examination (general) (routine) without abnormal findings    Encounter for screening mammogram for malignant neoplasm of breast  -     Mammo screening bilateral w 3d & cad; Future          Subjective:      Patient ID: Zuhair Benedict is a 53 y o  female  This patient presents for routine annual gyn exam   Medically stable   LMP 2018  She denies acute gyn complaints  She denies  bleeding or spotting, VM sx, pelvic pain, breast concerns, abn discharge, bowel/bladder dysfunction, depression/anx   and monog  Denies STI concerns  The following portions of the patient's history were reviewed and updated as appropriate: allergies, current medications, past family history, past medical history, past social history, past surgical history and problem list     Review of Systems   Constitutional: Negative  Respiratory: Negative  Cardiovascular: Negative  Gastrointestinal: Negative  Genitourinary: Negative  Musculoskeletal: Negative  Skin: Negative  Neurological: Negative  Psychiatric/Behavioral: Negative            Objective:      /74   Wt 85 3 kg (188 lb)   LMP 05/01/2018 (Approximate)   BMI 35 72 kg/m²          Physical Exam  Constitutional:       Appearance: She is well-developed  HENT:      Head: Normocephalic and atraumatic  Eyes:      Pupils: Pupils are equal, round, and reactive to light  Neck:      Musculoskeletal: Normal range of motion and neck supple  Thyroid: No thyromegaly  Cardiovascular:      Rate and Rhythm: Normal rate and regular rhythm  Heart sounds: Normal heart sounds  Pulmonary:      Effort: Pulmonary effort is normal  No respiratory distress  Breath sounds: Normal breath sounds  No wheezing or rales  Chest:      Chest wall: No mass, deformity or tenderness  Breasts: Breasts are symmetrical          Right: No inverted nipple, mass, nipple discharge, skin change or tenderness  Left: No inverted nipple, mass, nipple discharge, skin change or tenderness  Abdominal:      General: There is no distension  Palpations: Abdomen is soft  There is no hepatomegaly, splenomegaly or mass  Tenderness: There is no abdominal tenderness  There is no guarding or rebound  Genitourinary:     Labia:         Right: No rash, tenderness, lesion or injury  Left: No rash, tenderness, lesion or injury  Vagina: Normal  No foreign body  No vaginal discharge, erythema, tenderness or bleeding  Cervix: No cervical motion tenderness, discharge or friability  Uterus: Normal        Adnexa:         Right: No mass, tenderness or fullness  Left: No mass, tenderness or fullness  Rectum: Normal        Musculoskeletal: Normal range of motion  Lymphadenopathy:      Cervical: No cervical adenopathy  Skin:     General: Skin is warm and dry  Findings: No rash  Nails: There is no clubbing  Neurological:      Mental Status: She is alert and oriented to person, place, and time  Cranial Nerves: No cranial nerve deficit  Psychiatric:         Speech: Speech normal          Behavior: Behavior normal          Thought Content:  Thought content normal          Judgment: Judgment normal

## 2021-03-24 NOTE — ASSESSMENT & PLAN NOTE
Benign findings on routine gyn exam  Recommended monthly SBE, annual CBE and annual screening mammo  ASCCP guidelines reviewed and pap with cotesting noted to be up to date  Colonoscopy noted to be up to date (2016)  The patient denies STI risk factors and declines testing at this time  Reviewed diet/activity recommendations:  Encouraged daily Ca++ and vitamin D intake as well as daily weight bearing exercise for promotion of bone health    Discussed postmenopausal considerations and symptoms to report  RTO in one year for routine annual gyn exam or sooner PRN

## 2021-05-06 ENCOUNTER — HOSPITAL ENCOUNTER (OUTPATIENT)
Dept: RADIOLOGY | Age: 51
Discharge: HOME | End: 2021-05-06
Payer: COMMERCIAL

## 2021-05-06 VITALS — BODY MASS INDEX: 36.52 KG/M2 | HEIGHT: 60 IN | WEIGHT: 186 LBS

## 2021-05-06 DIAGNOSIS — Z12.31 ENCOUNTER FOR SCREENING MAMMOGRAM FOR MALIGNANT NEOPLASM OF BREAST: ICD-10-CM

## 2021-05-06 PROCEDURE — 77067 SCR MAMMO BI INCL CAD: CPT

## 2021-05-06 PROCEDURE — 77063 BREAST TOMOSYNTHESIS BI: CPT

## 2021-06-10 ENCOUNTER — RA CDI HCC (OUTPATIENT)
Dept: OTHER | Facility: HOSPITAL | Age: 51
End: 2021-06-10

## 2021-06-10 NOTE — PROGRESS NOTES
[facility] 75  coding opportunities          Chart reviewed, no opportunity found: CHART REVIEWED, NO OPPORTUNITY FOUND                     Patients insurance company: [employer] (Medicare Advantage and Commercial)

## 2021-06-11 ENCOUNTER — HOSPITAL ENCOUNTER (OUTPATIENT)
Dept: RADIOLOGY | Age: 51
Discharge: HOME | End: 2021-06-11
Payer: COMMERCIAL

## 2021-06-11 DIAGNOSIS — N83.202 CYST OF LEFT OVARY: ICD-10-CM

## 2021-06-11 PROCEDURE — 76856 US EXAM PELVIC COMPLETE: CPT

## 2021-06-11 PROCEDURE — 76830 TRANSVAGINAL US NON-OB: CPT

## 2021-06-16 ENCOUNTER — OFFICE VISIT (OUTPATIENT)
Dept: INTERNAL MEDICINE CLINIC | Age: 51
End: 2021-06-16
Payer: COMMERCIAL

## 2021-06-16 VITALS
DIASTOLIC BLOOD PRESSURE: 60 MMHG | HEART RATE: 77 BPM | TEMPERATURE: 97.8 F | OXYGEN SATURATION: 95 % | HEIGHT: 62 IN | WEIGHT: 188 LBS | BODY MASS INDEX: 34.6 KG/M2 | SYSTOLIC BLOOD PRESSURE: 112 MMHG

## 2021-06-16 DIAGNOSIS — Z00.00 ANNUAL PHYSICAL EXAM: Primary | ICD-10-CM

## 2021-06-16 DIAGNOSIS — Z12.11 COLON CANCER SCREENING: ICD-10-CM

## 2021-06-16 DIAGNOSIS — Z80.0 FAMILY HISTORY OF COLON CANCER IN FATHER: ICD-10-CM

## 2021-06-16 PROCEDURE — 3008F BODY MASS INDEX DOCD: CPT | Performed by: INTERNAL MEDICINE

## 2021-06-16 PROCEDURE — 3725F SCREEN DEPRESSION PERFORMED: CPT | Performed by: INTERNAL MEDICINE

## 2021-06-16 PROCEDURE — 99396 PREV VISIT EST AGE 40-64: CPT | Performed by: INTERNAL MEDICINE

## 2021-06-16 PROCEDURE — 1036F TOBACCO NON-USER: CPT | Performed by: INTERNAL MEDICINE

## 2021-06-16 NOTE — PROGRESS NOTES
Assessment/Plan:    Annual physical exam  - History and physical examination done  - Pt was counseled to eat a heart healthy diet, to drink at least 2 L of water daily, to take a daily multivitamin and to exercise for at least 30 minutes of cardio exercise daily, for at least 5 days a week  - CBC, CMP, TSH and lipid panel have been ordered and we will follow up with the results  - she is up-to-date with her COVID vaccine with needs the tetanus vaccine  - patient is up-to-date with her Pap smear and her mammogram and will be due for a colonoscopy in 2 months   - follow up in 1 year or sooner for an annual physical exam      Colon cancer screen  -will refer patient to Gastroenterology for a colonoscopy especially with her family history of colon cancer in her dad in his 62s        Diagnoses and all orders for this visit:    Annual physical exam  -     CBC and differential; Future  -     TSH, 3rd generation with Free T4 reflex; Future  -     Comprehensive metabolic panel; Future  -     Lipid panel; Future    BMI 34 0-34 9,adult    Colon cancer screening  -     Ambulatory referral to Gastroenterology; Future    Family history of colon cancer in father  -     Ambulatory referral to Gastroenterology; Future          BMI Counseling: Body mass index is 34 16 kg/m²  The BMI is above normal  Nutrition recommendations include limiting drinks that contain sugar, reducing intake of saturated and trans fat and reducing intake of cholesterol  Exercise recommendations include moderate physical activity 150 minutes/week  No pharmacotherapy was ordered  Patient referred to PCP due to patient being overweight  Subjective:      Patient ID: Prieto Velasco is a 53 y o  female      HPI  Patient presents for an annual physical exam     Last annual physical exam-  March 2020    Past medical history- Post menopausal bleeding,  Pedal edema, obesity    Past surgical history- appendectomy,     Medications- multivitamins    Allergies- NKDA    Diet- mixture of balanced diet and junk, drinks about 3 16 oz bottles of water daily    Exercise- walks everyday    Alcohol use- occasionally,  Max of 2 drinks    Caffeine and soda use- iced tea( about 2-3 glasses a day) and soda,     Nicotine use- never    Recreational drug use- never    Work-     Sexual history, STD history and HIV testing- monogamous with , STD hx -  Never, hiv testing - never    Gynecological history/Prostate health/testicular health history- LMP - 2-3 years ago, last pap smear - March 2021, last mammogram - 2021 - normal    Colonoscopy- 2016 and it was normal and she was told to return in about 5 years    Immunization history- up to date with covid , not up to date with tetanus    Dental visit- every six months     Vision- myopia - glasses    Family history- colon ca - dad(diagnosed in his 62s), DM - dad, breast cancer and liver - mom    Today, patient c/o of a lump on the back of her scalp and just recently finished antibiotics yesterday  She admits to night sweats likely secondary to menopause, occasional headaches, occasional blood in her stool and occasional knee pain especially on the left  She denies fever, chills, dizziness, nasal congestion, rhinorrhea, sore throat, cough, shortness of breath, palpitations, wheezing, nausea, vomiting, abdominal pain, diarrhea, constipation, feelings of anxiety or depression or hematuria  The following portions of the patient's history were reviewed and updated as appropriate:   She  has a past medical history of Abnormal mammogram, Benign tumor of breast, Dysfunctional uterine bleeding, and Skin lesion of scalp    She   Patient Active Problem List    Diagnosis Date Noted    BMI 34 0-34 9,adult 06/16/2021    Colon cancer screening 06/16/2021    Family history of colon cancer in father 06/16/2021    Postmenopausal bleeding 03/16/2020    Annual physical exam 03/16/2020    URTI (acute upper respiratory infection) 01/02/2019    Rash 10/11/2018    Class 2 obesity due to excess calories without serious comorbidity with body mass index (BMI) of 36 0 to 36 9 in adult 09/10/2018    Bilateral leg edema 08/27/2018    Dyspnea on exertion 08/27/2018    Skin lesion of scalp 11/09/2017    Abnormal mammogram 11/10/2016     She  has a past surgical history that includes Appendectomy; pr colonoscopy flx dx w/collj spec when pfrmd (N/A, 7/20/2016); Breast surgery (Left, 09/2012); Mammo stereotactic breast biopsy left (all inc) (Left, 09/25/2012); and Breast biopsy (Left)  Her family history includes Bone cancer in her father; Breast cancer (age of onset: 72) in her mother; Breast cancer (age of onset: 67) in her paternal aunt; Cancer in her father and mother; Colon cancer (age of onset: 76) in her father; Diabetes in her father; Liver cancer in her father and mother; Lung cancer in her mother; No Known Problems in her brother, maternal aunt, maternal grandfather, maternal grandmother, paternal grandfather, and paternal grandmother  She  reports that she has never smoked  She has never used smokeless tobacco  She reports current alcohol use of about 2 0 standard drinks of alcohol per week  She reports that she does not use drugs  Current Outpatient Medications   Medication Sig Dispense Refill    Multiple Vitamin (MULTIVITAMIN) tablet Take 2 tablets by mouth daily  No current facility-administered medications for this visit  Current Outpatient Medications on File Prior to Visit   Medication Sig    Multiple Vitamin (MULTIVITAMIN) tablet Take 2 tablets by mouth daily  No current facility-administered medications on file prior to visit  She has No Known Allergies       Review of Systems   Constitutional: Positive for diaphoresis (hot flashes with night sweats)  Negative for activity change, chills, fatigue, fever and unexpected weight change     HENT: Negative for ear pain, postnasal drip, rhinorrhea, sinus pressure and sore throat  Eyes: Negative for pain  Respiratory: Negative for cough, choking, chest tightness, shortness of breath and wheezing  Cardiovascular: Negative for chest pain, palpitations and leg swelling  Gastrointestinal: Positive for blood in stool (occasionally)  Negative for abdominal pain, constipation, diarrhea, nausea and vomiting  Genitourinary: Negative for dysuria and hematuria  Musculoskeletal: Positive for arthralgias (occasionally on left knee)  Negative for back pain, gait problem, joint swelling, myalgias and neck stiffness  Skin: Negative for pallor and rash  Neurological: Positive for headaches (occasionally)  Negative for dizziness, tremors, seizures, syncope and light-headedness  Hematological: Negative for adenopathy  Psychiatric/Behavioral: Negative for behavioral problems and dysphoric mood  The patient is not nervous/anxious  Objective:      /60 (BP Location: Left arm, Patient Position: Sitting, Cuff Size: Large)   Pulse 77   Temp 97 8 °F (36 6 °C) (Temporal)   Ht 5' 2 21" (1 58 m)   Wt 85 3 kg (188 lb)   LMP 05/01/2018 (Approximate)   SpO2 95%   BMI 34 16 kg/m²          Physical Exam  Constitutional:       General: She is not in acute distress  Appearance: She is well-developed  She is not diaphoretic  HENT:      Head: Normocephalic and atraumatic  Right Ear: External ear normal       Left Ear: External ear normal       Nose: Mucosal edema (Worse on the left compared to the right) present  No congestion  Mouth/Throat:      Mouth: Mucous membranes are dry  Pharynx: No oropharyngeal exudate or posterior oropharyngeal erythema  Comments: Dry mucous membranes with Mallampati class 4 oropharynx  Eyes:      General: No scleral icterus  Right eye: No discharge  Left eye: No discharge        Conjunctiva/sclera: Conjunctivae normal       Pupils: Pupils are equal, round, and reactive to light    Neck:      Thyroid: No thyromegaly  Vascular: No JVD  Trachea: No tracheal deviation  Cardiovascular:      Rate and Rhythm: Normal rate and regular rhythm  Heart sounds: Normal heart sounds  No murmur heard  No friction rub  No gallop  Pulmonary:      Effort: Pulmonary effort is normal  No respiratory distress  Breath sounds: Normal breath sounds  No wheezing or rales  Chest:      Chest wall: No tenderness  Abdominal:      General: Bowel sounds are normal  There is no distension  Palpations: Abdomen is soft  There is no mass  Tenderness: There is no abdominal tenderness  There is no guarding or rebound  Musculoskeletal:         General: No tenderness or deformity  Normal range of motion  Cervical back: Normal range of motion and neck supple  Lymphadenopathy:      Cervical: No cervical adenopathy  Skin:     General: Skin is warm and dry  Coloration: Skin is not pale  Findings: No erythema, lesion ( no lesions seen at the back of the scalp) or rash  Neurological:      Mental Status: She is alert and oriented to person, place, and time  Cranial Nerves: No cranial nerve deficit  Motor: No abnormal muscle tone  Coordination: Coordination normal       Deep Tendon Reflexes: Reflexes are normal and symmetric  Comments: Cranial nerves 2-12 are intact bilaterally  Muscle strength is 5/5 in all extremities  Sensation is intact in bilateral face and extremities  Rapid alternating movement and finger-to-nose pointing test intact   Deep tendon reflexes are 2+ bilaterally  Gait is intact         Psychiatric:         Behavior: Behavior normal            No visits with results within 12 Month(s) from this visit     Latest known visit with results is:   Annual Exam on 03/16/2020   Component Date Value Ref Range Status    Case Report 03/16/2020    Final                    Value:Gynecologic Cytology Report                       Case: [id number] Authorizing Provider:  MAR Maldonado       Collected:           03/16/2020 3917              Ordering Location:     [facility]      Received:            03/16/2020 0853                                     [facility]                                                                               [place] [place] Screen:          Dulce Diego                                                                  Specimen:    LIQUID-BASED PAP, SCREENING, Cervix                                                        Primary Interpretation 03/16/2020 Negative for intraepithelial lesion or malignancy   Final    Specimen Adequacy 03/16/2020 Satisfactory for evaluation  Endocervical/transformation zone component present  Final    Additional Information 03/16/2020    Final                    Value: This result contains rich text formatting which cannot be displayed here   HPV Other HR 03/16/2020 Negative  Negative Final    HPV types: 17,29,26,99,07,02,67,74,35,91,39 and 68 DNA are undetectable or below the pre-set threshold      HPV16 03/16/2020 Negative  Negative Final    HPV18 03/16/2020 Negative  Negative Final

## 2021-06-16 NOTE — PATIENT INSTRUCTIONS

## 2021-06-24 ENCOUNTER — TELEPHONE (OUTPATIENT)
Dept: OBGYN CLINIC | Facility: CLINIC | Age: 51
End: 2021-06-24

## 2021-06-24 NOTE — TELEPHONE ENCOUNTER
----- Message from Autumn Thomas, [address]  sent at 6/24/2021  1:22 PM EDT -----  Pelvic US was recommended as f/u on left paraovarian cyst noted on 2020 scan   Please advise that this is stable and has benign features  No further imaging is indicated at this time   She may f/u as needed for acute gyn complaints

## 2021-08-26 ENCOUNTER — TELEPHONE (OUTPATIENT)
Dept: GASTROENTEROLOGY | Facility: CLINIC | Age: 51
End: 2021-08-26

## 2021-08-31 ENCOUNTER — TELEPHONE (OUTPATIENT)
Dept: GASTROENTEROLOGY | Facility: HOSPITAL | Age: 51
End: 2021-08-31

## 2021-09-01 ENCOUNTER — HOSPITAL ENCOUNTER (OUTPATIENT)
Dept: GASTROENTEROLOGY | Facility: HOSPITAL | Age: 51
Setting detail: OUTPATIENT SURGERY
Discharge: HOME | End: 2021-09-01
Attending: INTERNAL MEDICINE
Payer: COMMERCIAL

## 2021-09-01 ENCOUNTER — ANESTHESIA EVENT (OUTPATIENT)
Dept: GASTROENTEROLOGY | Facility: HOSPITAL | Age: 51
End: 2021-09-01

## 2021-09-01 ENCOUNTER — ANESTHESIA (OUTPATIENT)
Dept: GASTROENTEROLOGY | Facility: HOSPITAL | Age: 51
End: 2021-09-01

## 2021-09-01 VITALS
HEART RATE: 75 BPM | SYSTOLIC BLOOD PRESSURE: 108 MMHG | RESPIRATION RATE: 17 BRPM | DIASTOLIC BLOOD PRESSURE: 78 MMHG | TEMPERATURE: 97.9 F | OXYGEN SATURATION: 99 %

## 2021-09-01 DIAGNOSIS — Z12.11 SPECIAL SCREENING FOR MALIGNANT NEOPLASMS, COLON: ICD-10-CM

## 2021-09-01 PROCEDURE — 88305 TISSUE EXAM BY PATHOLOGIST: CPT | Performed by: PATHOLOGY

## 2021-09-01 PROCEDURE — 45385 COLONOSCOPY W/LESION REMOVAL: CPT | Performed by: INTERNAL MEDICINE

## 2021-09-01 RX ORDER — PROPOFOL 10 MG/ML
INJECTION, EMULSION INTRAVENOUS CONTINUOUS PRN
Status: DISCONTINUED | OUTPATIENT
Start: 2021-09-01 | End: 2021-09-01

## 2021-09-01 RX ORDER — SODIUM CHLORIDE 9 MG/ML
INJECTION, SOLUTION INTRAVENOUS CONTINUOUS PRN
Status: DISCONTINUED | OUTPATIENT
Start: 2021-09-01 | End: 2021-09-01

## 2021-09-01 RX ORDER — PROPOFOL 10 MG/ML
INJECTION, EMULSION INTRAVENOUS AS NEEDED
Status: DISCONTINUED | OUTPATIENT
Start: 2021-09-01 | End: 2021-09-01

## 2021-09-01 RX ADMIN — PROPOFOL 120 MCG/KG/MIN: 10 INJECTION, EMULSION INTRAVENOUS at 10:02

## 2021-09-01 RX ADMIN — PROPOFOL 100 MG: 10 INJECTION, EMULSION INTRAVENOUS at 10:04

## 2021-09-01 RX ADMIN — SODIUM CHLORIDE: 0.9 INJECTION, SOLUTION INTRAVENOUS at 09:57

## 2021-09-01 NOTE — H&P
History and Physical - [facility]  Bret Dolan 49 y o  female MRN: [id number]    HPI: Bret Dolan is a 49y o  year old female who presents for screening colonoscopy  She has family history of colon cancer including her father  Review of Systems    Historical Information   Past Medical History:   Diagnosis Date    Abnormal mammogram     Benign tumor of breast     left breast    Dysfunctional uterine bleeding     Resolved: 11/9/2017    Skin lesion of scalp     last assessed: 11/9/2017     Past Surgical History:   Procedure Laterality Date    APPENDECTOMY      BREAST BIOPSY Left     BREAST SURGERY Left 09/2012    bx    MAMMO STEREOTACTIC BREAST BIOPSY LEFT (ALL INC) Left 09/25/2012    WI COLONOSCOPY FLX DX W/COLLJ SPEC WHEN PFRMD N/A 7/20/2016    Procedure: COLONOSCOPY;  Surgeon: Gris Guzmán MD;  Location: [facility];   Service: Gastroenterology     Social History   Social History     Substance and Sexual Activity   Alcohol Use Yes    Alcohol/week: 2 0 standard drinks    Types: 2 Standard drinks or equivalent per week    Comment: Drink Occassionaly     Social History     Substance and Sexual Activity   Drug Use No     Social History     Tobacco Use   Smoking Status Never Smoker   Smokeless Tobacco Never Used   Tobacco Comment    Never Smoked     Family History   Problem Relation Age of Onset    Cancer Father     Colon cancer Father 76    Liver cancer Father     Bone cancer Father     Diabetes Father     Breast cancer Mother 72    Lung cancer Mother     Liver cancer Mother     Cancer Mother     Breast cancer Paternal Aunt 67    No Known Problems Maternal Grandmother     No Known Problems Maternal Grandfather     No Known Problems Paternal Grandmother     No Known Problems Paternal Grandfather     No Known Problems Brother     No Known Problems Maternal Aunt        Meds/Allergies     (Not in a hospital admission)      No Known Allergies    Objective /58   Pulse 64   Temp 98 7 °F (37 1 °C) (Tympanic)   Resp 17   LMP 05/01/2018 (Approximate)   SpO2 97%       PHYSICAL EXAM    Gen: NAD  CV: RRR  CHEST: Clear  ABD: soft, NT/ND  EXT: no edema  Neuro: AAO      ASSESSMENT/PLAN:  This is a 49y o  year old female here for screening colonoscopy      PLAN:   Procedure:  Colonoscopy with biopsy and possible polypectomy

## 2021-09-01 NOTE — ANESTHESIA PREPROCEDURE EVALUATION
Procedure:  COLONOSCOPY    Relevant Problems   CARDIO   (+) Dyspnea on exertion      PULMONARY   (+) Dyspnea on exertion   (+) URTI (acute upper respiratory infection)        Physical Exam    Airway    Mallampati score: III  TM Distance: >3 FB  Neck ROM: full     Dental   No notable dental hx     Cardiovascular  Cardiovascular exam normal    Pulmonary  Pulmonary exam normal     Other Findings      Denies any dyspnea  Anesthesia Plan  ASA Score- 2     Anesthesia Type- IV sedation with anesthesia with ASA Monitors  Additional Monitors:   Airway Plan:           Plan Factors-Exercise tolerance (METS): >4 METS  Chart reviewed  Existing labs reviewed  Patient summary reviewed  Patient is not a current smoker  Induction- intravenous  Postoperative Plan-     Informed Consent- Anesthetic plan and risks discussed with patient

## 2022-01-19 ENCOUNTER — TELEPHONE (OUTPATIENT)
Dept: INTERNAL MEDICINE CLINIC | Age: 52
End: 2022-01-19

## 2022-01-24 ENCOUNTER — TELEPHONE (OUTPATIENT)
Dept: OBGYN CLINIC | Facility: CLINIC | Age: 52
End: 2022-01-24

## 2022-01-24 NOTE — TELEPHONE ENCOUNTER
Patient  has red spot on breast and is scheduled to come in with Zara Monte  In March for annual   Patient does not know if she can wait until then or does she need an appointment sooner

## 2022-01-24 NOTE — TELEPHONE ENCOUNTER
Pt said she has a tiny red spot on breast, asked her if she can take a pic and we can show wl if an apt is necessary

## 2022-01-25 NOTE — TELEPHONE ENCOUNTER
Spoke to pt and she wanted to know how she can upload a photo   I sent her a [employer] msg so she can reply

## 2022-01-27 ENCOUNTER — OFFICE VISIT (OUTPATIENT)
Dept: INTERNAL MEDICINE CLINIC | Age: 52
End: 2022-01-27
Payer: COMMERCIAL

## 2022-01-27 VITALS
HEIGHT: 62 IN | OXYGEN SATURATION: 98 % | DIASTOLIC BLOOD PRESSURE: 78 MMHG | TEMPERATURE: 98.4 F | WEIGHT: 188.6 LBS | BODY MASS INDEX: 34.71 KG/M2 | SYSTOLIC BLOOD PRESSURE: 120 MMHG | HEART RATE: 68 BPM

## 2022-01-27 DIAGNOSIS — R92.2 DENSE BREASTS: ICD-10-CM

## 2022-01-27 DIAGNOSIS — L98.8 LESION OF SKIN OF BREAST: Primary | ICD-10-CM

## 2022-01-27 DIAGNOSIS — Z80.3 FAMILY HISTORY OF BREAST CANCER IN MOTHER: ICD-10-CM

## 2022-01-27 PROBLEM — N63.10 LUMP OF RIGHT BREAST: Status: ACTIVE | Noted: 2022-01-27

## 2022-01-27 PROBLEM — N63.10 LUMP OF RIGHT BREAST: Status: RESOLVED | Noted: 2022-01-27 | Resolved: 2022-01-27

## 2022-01-27 PROBLEM — R92.30 DENSE BREASTS: Status: ACTIVE | Noted: 2022-01-27

## 2022-01-27 PROBLEM — E66.09 CLASS 2 OBESITY DUE TO EXCESS CALORIES WITHOUT SERIOUS COMORBIDITY WITH BODY MASS INDEX (BMI) OF 36.0 TO 36.9 IN ADULT: Status: RESOLVED | Noted: 2018-09-10 | Resolved: 2022-01-27

## 2022-01-27 PROBLEM — E66.812 CLASS 2 OBESITY DUE TO EXCESS CALORIES WITHOUT SERIOUS COMORBIDITY WITH BODY MASS INDEX (BMI) OF 36.0 TO 36.9 IN ADULT: Status: RESOLVED | Noted: 2018-09-10 | Resolved: 2022-01-27

## 2022-01-27 PROCEDURE — 3008F BODY MASS INDEX DOCD: CPT | Performed by: INTERNAL MEDICINE

## 2022-01-27 PROCEDURE — 3725F SCREEN DEPRESSION PERFORMED: CPT | Performed by: INTERNAL MEDICINE

## 2022-01-27 PROCEDURE — 99213 OFFICE O/P EST LOW 20 MIN: CPT | Performed by: INTERNAL MEDICINE

## 2022-01-27 NOTE — PROGRESS NOTES
Assessment/Plan:    Skin lesion of right breast with dense breasts and a family history of breast cancer  -will order a diagnostic mammogram of the right breast  -patient already has screening mammograms ordered for both breasts and she was counseled to get it done  -we will follow up with the results     Diagnoses and all orders for this visit:    Lesion of skin of breast  -     Mammo diagnostic right w cad; Future    Family history of breast cancer in mother  -     Mammo diagnostic right w cad; Future    Dense breasts  -     Mammo diagnostic right w cad; Future    BMI 34 0-34 9,adult        BMI Counseling: Body mass index is 34 26 kg/m²  The BMI is above normal  Nutrition recommendations include limiting drinks that contain sugar, reducing intake of saturated and trans fat and reducing intake of cholesterol  Exercise recommendations include moderate physical activity 150 minutes/week  No pharmacotherapy was ordered  Patient referred to PCP  Rationale for BMI follow-up plan is due to patient being overweight or obese  Depression Screening and Follow-up Plan: Patient was screened for depression during today's encounter  They screened negative with a PHQ-2 score of 0  Subjective:      Patient ID: Jim Gordon is a 49 y o  female  HPI  Pt presents with c/o of a spot on her right breat for the past one week  She states that it feels like it is a lump, non tender and itchy sometimes  She denies any nipple discharge, and states that there are no skin changes on the breast   She has a hx of an abnormal mammogram and had to go for a biopsy some years ago and it was benign  She has a family his of breast ca in her mom and colon ca and lung ca in her dad  Dad smoked  She never smoked  She denies a family hx of ovarian ca  She denies swollen glands  She never used OCP  Her  LMP was 2 years ago  She has never had an abnormal pap smear    Found the lump during a monthly self-breast exam   She admits to diaphoresis and chronic back pain but denies any fever, chills, headache, dizziness, chest pain, shortness of breath, palpitations, nausea, vomiting abdominal pain, diarrhea, constipation, myalgias, arthralgias, headache or dizziness  The following portions of the patient's history were reviewed and updated as appropriate:   She  has a past medical history of Abnormal mammogram, Benign tumor of breast, Dysfunctional uterine bleeding, and Skin lesion of scalp  She   Patient Active Problem List    Diagnosis Date Noted    Family history of breast cancer in mother 01/27/2022    Lesion of skin of breast 01/27/2022    Dense breasts 01/27/2022    BMI 34 0-34 9,adult 06/16/2021    Colon cancer screening 06/16/2021    Family history of colon cancer in father 06/16/2021    Postmenopausal bleeding 03/16/2020    Annual physical exam 03/16/2020    URTI (acute upper respiratory infection) 01/02/2019    Rash 10/11/2018    Bilateral leg edema 08/27/2018    Dyspnea on exertion 08/27/2018    Skin lesion of scalp 11/09/2017    Abnormal mammogram 11/10/2016     She  has a past surgical history that includes Appendectomy; pr colonoscopy flx dx w/collj spec when pfrmd (N/A, 7/20/2016); Mammo stereotactic breast biopsy left (all inc) (Left, 09/25/2012); Breast surgery (Left, 09/2012); and Breast biopsy (Left)  Her family history includes Bone cancer in her father; Breast cancer (age of onset: 72) in her mother; Breast cancer (age of onset: 67) in her paternal aunt; Cancer in her father and mother; Colon cancer (age of onset: 76) in her father; Diabetes in her father; Liver cancer in her father and mother; Lung cancer in her mother; No Known Problems in her brother, maternal aunt, maternal grandfather, maternal grandmother, paternal grandfather, and paternal grandmother  She  reports that she has never smoked   She has never used smokeless tobacco  She reports current alcohol use of about 2 0 standard drinks of alcohol per week  She reports that she does not use drugs  Current Outpatient Medications   Medication Sig Dispense Refill    Multiple Vitamin (MULTIVITAMIN) tablet Take 2 tablets by mouth daily  No current facility-administered medications for this visit  Current Outpatient Medications on File Prior to Visit   Medication Sig    Multiple Vitamin (MULTIVITAMIN) tablet Take 2 tablets by mouth daily  No current facility-administered medications on file prior to visit  She has No Known Allergies       Review of Systems   Constitutional: Positive for diaphoresis (occasionally, menopausal)  Negative for activity change, chills, fatigue, fever and unexpected weight change  HENT: Negative for ear pain, postnasal drip, rhinorrhea, sinus pressure and sore throat  Eyes: Negative for pain  Respiratory: Negative for cough, choking, chest tightness, shortness of breath and wheezing  Cardiovascular: Negative for chest pain, palpitations and leg swelling  Gastrointestinal: Negative for abdominal pain, constipation, diarrhea, nausea and vomiting  Genitourinary: Negative for dysuria and hematuria  Musculoskeletal: Positive for back pain (lower back pain, chronic and unchanged)  Negative for arthralgias, gait problem, joint swelling, myalgias and neck stiffness  Lump in right breast   Skin: Negative for pallor and rash  Neurological: Negative for dizziness, tremors, seizures, syncope, light-headedness and headaches  Hematological: Negative for adenopathy  Psychiatric/Behavioral: Negative for behavioral problems  Objective:      /78 (BP Location: Left arm, Patient Position: Sitting, Cuff Size: Standard)   Pulse 68   Temp 98 4 °F (36 9 °C) (Temporal)   Ht 5' 2 21" (1 58 m)   Wt 85 5 kg (188 lb 9 6 oz)   LMP 05/01/2018 (Approximate)   SpO2 98%   BMI 34 26 kg/m²          Physical Exam  Exam conducted with a chaperone present (Fauzia MCKEON)     Constitutional:       General: She is not in acute distress  Appearance: She is well-developed  She is not diaphoretic  HENT:      Head: Normocephalic and atraumatic  Right Ear: External ear normal       Left Ear: External ear normal       Nose: Nose normal       Mouth/Throat:      Mouth: Mucous membranes are dry  Pharynx: No oropharyngeal exudate  Comments: Dry mucous membranes  Eyes:      General: No scleral icterus  Right eye: No discharge  Left eye: No discharge  Conjunctiva/sclera: Conjunctivae normal       Pupils: Pupils are equal, round, and reactive to light  Neck:      Thyroid: No thyromegaly  Vascular: No JVD  Trachea: No tracheal deviation  Cardiovascular:      Rate and Rhythm: Normal rate and regular rhythm  Heart sounds: Normal heart sounds  No murmur heard  No friction rub  No gallop  Pulmonary:      Effort: Pulmonary effort is normal  No respiratory distress  Breath sounds: Normal breath sounds  No wheezing or rales  Chest:      Chest wall: No tenderness  Breasts:      Right: Skin change (Right lesions on the skin of the right breast, a larger 1 in the outer lower quadrant, measuring about 4 mm and the smaller 1 in the inner lower quadrant, about 2 5 mm, both red in color, with dense breasts, worse on the right compared to the left) present  No inverted nipple, nipple discharge, tenderness, axillary adenopathy or supraclavicular adenopathy  Left: No inverted nipple, nipple discharge, skin change, tenderness, axillary adenopathy or supraclavicular adenopathy  Abdominal:      General: Bowel sounds are normal  There is no distension  Palpations: Abdomen is soft  There is no mass  Tenderness: There is no abdominal tenderness  There is no guarding or rebound  Musculoskeletal:         General: No tenderness or deformity  Normal range of motion  Cervical back: Normal range of motion and neck supple        Right lower le+ Edema present  Left lower le+ Edema (1+ pitting pedal edema bilaterally) present  Lymphadenopathy:      Cervical: No cervical adenopathy  Upper Body:      Right upper body: No supraclavicular or axillary adenopathy  Left upper body: No supraclavicular or axillary adenopathy  Skin:     General: Skin is warm and dry  Coloration: Skin is not pale  Findings: No erythema or rash  Neurological:      Mental Status: She is alert and oriented to person, place, and time  Cranial Nerves: No cranial nerve deficit  Motor: No abnormal muscle tone  Coordination: Coordination normal       Deep Tendon Reflexes: Reflexes are normal and symmetric  Psychiatric:         Behavior: Behavior normal            Hospital Outpatient Visit on 2021   Component Date Value Ref Range Status    Case Report 2021    Final                    Value:Surgical Pathology Report                         Case: [id number]                                   Authorizing Provider:  Le Vega MD           Collected:           2021 1010              Ordering Location:     [address] [place] [address]      Received:            2021 [address] [facility]                                                           Pathologist:           Mohit Smith MD                                                         Specimens:   A) - Polyp, Colorectal, ascending                                                                   B) - Polyp, Colorectal, transverse                                                                  C) - Polyp, Colorectal, rectal                                                             Final Diagnosis 2021    Final                    Value: This result contains rich text formatting which cannot be displayed here   Additional Information 2021    Final                    Value: This result contains rich text formatting which cannot be displayed here   Synoptic Checklist 09/01/2021    Final                    Value:  (COLON/RECTUM POLYP FORM - GI - A, B)                                                                                                                 :    Adenoma(s)      Gross Description 09/01/2021    Final                    Value: This result contains rich text formatting which cannot be displayed here

## 2022-03-23 ENCOUNTER — ANNUAL EXAM (OUTPATIENT)
Dept: OBGYN CLINIC | Facility: CLINIC | Age: 52
End: 2022-03-23
Payer: COMMERCIAL

## 2022-03-23 VITALS
SYSTOLIC BLOOD PRESSURE: 120 MMHG | HEIGHT: 61 IN | BODY MASS INDEX: 35.53 KG/M2 | WEIGHT: 188.2 LBS | DIASTOLIC BLOOD PRESSURE: 72 MMHG

## 2022-03-23 DIAGNOSIS — B36.9 FUNGAL DERMATITIS: Primary | ICD-10-CM

## 2022-03-23 DIAGNOSIS — Z01.419 ENCNTR FOR GYN EXAM (GENERAL) (ROUTINE) W/O ABN FINDINGS: Primary | ICD-10-CM

## 2022-03-23 DIAGNOSIS — Z12.31 ENCOUNTER FOR SCREENING MAMMOGRAM FOR MALIGNANT NEOPLASM OF BREAST: ICD-10-CM

## 2022-03-23 PROBLEM — R92.2 DENSE BREASTS: Status: RESOLVED | Noted: 2022-01-27 | Resolved: 2022-03-23

## 2022-03-23 PROBLEM — R92.30 DENSE BREASTS: Status: RESOLVED | Noted: 2022-01-27 | Resolved: 2022-03-23

## 2022-03-23 PROBLEM — Z00.00 ANNUAL PHYSICAL EXAM: Status: RESOLVED | Noted: 2020-03-16 | Resolved: 2022-03-23

## 2022-03-23 PROBLEM — Z12.11 COLON CANCER SCREENING: Status: RESOLVED | Noted: 2021-06-16 | Resolved: 2022-03-23

## 2022-03-23 PROBLEM — J06.9 URTI (ACUTE UPPER RESPIRATORY INFECTION): Status: RESOLVED | Noted: 2019-01-02 | Resolved: 2022-03-23

## 2022-03-23 PROBLEM — N95.0 POSTMENOPAUSAL BLEEDING: Status: RESOLVED | Noted: 2020-03-16 | Resolved: 2022-03-23

## 2022-03-23 PROCEDURE — S0612 ANNUAL GYNECOLOGICAL EXAMINA: HCPCS | Performed by: PHYSICIAN ASSISTANT

## 2022-03-23 RX ORDER — NYSTATIN 100000 U/G
OINTMENT TOPICAL
Qty: 30 G | Refills: 0 | Status: SHIPPED | OUTPATIENT
Start: 2022-03-23

## 2022-03-23 RX ORDER — NYSTATIN AND TRIAMCINOLONE ACETONIDE 100000; 1 [USP'U]/G; MG/G
OINTMENT TOPICAL 2 TIMES DAILY
Qty: 30 G | Refills: 0 | Status: CANCELLED | OUTPATIENT
Start: 2022-03-23 | End: 2022-04-06

## 2022-03-23 NOTE — PROGRESS NOTES
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) and is a  for another organization  She is very busy  Last Pap: 3/16/20 neg/neg  Last Mammo:  5/6/21 neg; Tyrer-Cuzick risk assessment low, no recommendation for additional screening  Last Colonoscopy:  9/1/21 (due 2026)      We reviewed ASCCP guidelines for Pap testing  Family hx of breast cancer: mother, paternal aunt  Family hx of ovarian cancer: no  Family hx of colon cancer: father      Current Outpatient Medications:     Multiple Vitamin (MULTIVITAMIN) tablet, Take 2 tablets by mouth daily  , Disp: , Rfl:   Social History     Socioeconomic History    Marital status: /Civil Union     Spouse name: Not on file    Number of children: Not on file    Years of education: Not on file    Highest education level: Not on file   Occupational History    Not on file   Tobacco Use    Smoking status: Never Smoker    Smokeless tobacco: Never Used    Tobacco comment: Never Smoked   Vaping Use    Vaping Use: Never used   Substance and Sexual Activity    Alcohol use:  Yes     Alcohol/week: 2 0 standard drinks     Types: 2 Standard drinks or equivalent per week     Comment: Drink Occassionaly    Drug use: No    Sexual activity: Yes     Partners: Male     Birth control/protection: None   Other Topics Concern    Not on file   Social History Narrative    Caffeine use    Exercising regularly     Social Determinants of Health     Financial Resource Strain: Not on file   Food Insecurity: Not on file   Transportation Needs: Not on file   Physical Activity: Not on file   Stress: Not on file   Social Connections: Not on file   Intimate Partner Violence: Not on file   Housing Stability: Not on file     Family History   Problem Relation Age of Onset    Cancer Father     Colon cancer Father 76    Liver cancer Father     Bone cancer Father     Diabetes Father     Breast cancer Mother 72    Lung cancer Mother     Liver cancer Mother     Cancer Mother     Breast cancer Paternal Aunt 67    No Known Problems Maternal Grandmother     No Known Problems Maternal Grandfather     No Known Problems Paternal Grandmother     No Known Problems Paternal Grandfather     No Known Problems Brother     No Known Problems Maternal Aunt      Past Medical History:   Diagnosis Date    Abnormal mammogram     Benign tumor of breast     left breast    Dysfunctional uterine bleeding     Resolved: 11/9/2017    Skin lesion of scalp     last assessed: 11/9/2017        Review of Systems   Respiratory: Negative  Cardiovascular: Negative  Gastrointestinal: Negative for constipation and diarrhea  Genitourinary: Negative for difficulty urinating, pelvic pain, vaginal bleeding, vaginal discharge, itching or odor  O:  Blood pressure 120/72, height 5' 1 02" (1 55 m), weight 85 4 kg (188 lb 3 2 oz), last menstrual period 05/01/2018  Patient appears well and is not in distress  Neck is supple without masses  Breasts are symmetrical without mass, tenderness, nipple discharge, skin changes or adenopathy  Abdomen is soft and nontender without masses  External genitals are normal without lesions or rashes  Urethral meatus and urethra are normal  Bladder is normal to palpation  Vagina is normal without discharge or bleeding  Cervix is normal without discharge or lesion     Uterus is normal, mobile, nontender without palpable mass   Adnexa are normal, nontender, without palpable mass  A:  Yearly exam      P:   Pap 2025   Mammo slip given   Colonoscopy due 2026    RTO one year for yearly exam or sooner as needed

## 2022-05-12 ENCOUNTER — HOSPITAL ENCOUNTER (OUTPATIENT)
Dept: RADIOLOGY | Age: 52
Discharge: HOME | End: 2022-05-12
Payer: COMMERCIAL

## 2022-05-12 VITALS — WEIGHT: 182 LBS | BODY MASS INDEX: 33.49 KG/M2 | HEIGHT: 62 IN

## 2022-05-12 DIAGNOSIS — Z12.31 ENCOUNTER FOR SCREENING MAMMOGRAM FOR MALIGNANT NEOPLASM OF BREAST: ICD-10-CM

## 2022-05-12 PROCEDURE — 77067 SCR MAMMO BI INCL CAD: CPT

## 2022-05-12 PROCEDURE — 77063 BREAST TOMOSYNTHESIS BI: CPT

## 2022-05-19 ENCOUNTER — TELEPHONE (OUTPATIENT)
Dept: OBGYN CLINIC | Facility: CLINIC | Age: 52
End: 2022-05-19

## 2022-05-19 DIAGNOSIS — Z91.89 AT HIGH RISK FOR BREAST CANCER: Primary | ICD-10-CM

## 2022-05-19 NOTE — TELEPHONE ENCOUNTER
Spoke with Melony and made aware of the following:    Screening mammogram results are normal  Biopsy marker clip is noted  Benign-appearing calcifications are noted in each breast   Continue with monthly breast self exam and annual mammogram    Lifetime breast cancer risk score (tyrer-cuzick) is elevated above the threshold of 20%, which is related to your family history of breast cancer  Supplemental screening of a breast MRI is recommended  This should occur 6 months from the time this mammogram was completed  The order in in your chart  Please call the office one month prior to the planned MRI for the test to be pre authorized

## 2022-06-13 ENCOUNTER — APPOINTMENT (OUTPATIENT)
Dept: LAB | Age: 52
End: 2022-06-13
Payer: COMMERCIAL

## 2022-06-13 DIAGNOSIS — Z00.00 ANNUAL PHYSICAL EXAM: ICD-10-CM

## 2022-06-13 LAB
ALBUMIN SERPL BCP-MCNC: 3.7 G/DL (ref 3.5–5)
ALP SERPL-CCNC: 100 U/L (ref 46–116)
ALT SERPL W P-5'-P-CCNC: 35 U/L (ref 12–78)
ANION GAP SERPL CALCULATED.3IONS-SCNC: 2 MMOL/L (ref 4–13)
AST SERPL W P-5'-P-CCNC: 20 U/L (ref 5–45)
BASOPHILS # BLD AUTO: 0.05 THOUSANDS/ΜL (ref 0–0.1)
BASOPHILS NFR BLD AUTO: 1 % (ref 0–1)
BILIRUB SERPL-MCNC: 0.44 MG/DL (ref 0.2–1)
BUN SERPL-MCNC: 21 MG/DL (ref 5–25)
CALCIUM SERPL-MCNC: 9.3 MG/DL (ref 8.3–10.1)
CHLORIDE SERPL-SCNC: 107 MMOL/L (ref 100–108)
CHOLEST SERPL-MCNC: 184 MG/DL
CO2 SERPL-SCNC: 28 MMOL/L (ref 21–32)
CREAT SERPL-MCNC: 0.83 MG/DL (ref 0.6–1.3)
EOSINOPHIL # BLD AUTO: 0.15 THOUSAND/ΜL (ref 0–0.61)
EOSINOPHIL NFR BLD AUTO: 2 % (ref 0–6)
ERYTHROCYTE [DISTWIDTH] IN BLOOD BY AUTOMATED COUNT: 12.4 % (ref 11.6–15.1)
GFR SERPL CREATININE-BSD FRML MDRD: 82 ML/MIN/1.73SQ M
GLUCOSE P FAST SERPL-MCNC: 97 MG/DL (ref 65–99)
HCT VFR BLD AUTO: 42.4 % (ref 34.8–46.1)
HDLC SERPL-MCNC: 45 MG/DL
HGB BLD-MCNC: 13.7 G/DL (ref 11.5–15.4)
IMM GRANULOCYTES # BLD AUTO: 0.02 THOUSAND/UL (ref 0–0.2)
IMM GRANULOCYTES NFR BLD AUTO: 0 % (ref 0–2)
LDLC SERPL CALC-MCNC: 122 MG/DL (ref 0–100)
LYMPHOCYTES # BLD AUTO: 2.44 THOUSANDS/ΜL (ref 0.6–4.47)
LYMPHOCYTES NFR BLD AUTO: 34 % (ref 14–44)
MCH RBC QN AUTO: 28.6 PG (ref 26.8–34.3)
MCHC RBC AUTO-ENTMCNC: 32.3 G/DL (ref 31.4–37.4)
MCV RBC AUTO: 89 FL (ref 82–98)
MONOCYTES # BLD AUTO: 0.46 THOUSAND/ΜL (ref 0.17–1.22)
MONOCYTES NFR BLD AUTO: 7 % (ref 4–12)
NEUTROPHILS # BLD AUTO: 3.98 THOUSANDS/ΜL (ref 1.85–7.62)
NEUTS SEG NFR BLD AUTO: 56 % (ref 43–75)
NONHDLC SERPL-MCNC: 139 MG/DL
NRBC BLD AUTO-RTO: 0 /100 WBCS
PLATELET # BLD AUTO: 248 THOUSANDS/UL (ref 149–390)
PMV BLD AUTO: 10.9 FL (ref 8.9–12.7)
POTASSIUM SERPL-SCNC: 4.4 MMOL/L (ref 3.5–5.3)
PROT SERPL-MCNC: 7.2 G/DL (ref 6.4–8.2)
RBC # BLD AUTO: 4.79 MILLION/UL (ref 3.81–5.12)
SODIUM SERPL-SCNC: 137 MMOL/L (ref 136–145)
TRIGL SERPL-MCNC: 86 MG/DL
TSH SERPL DL<=0.05 MIU/L-ACNC: 1.47 UIU/ML (ref 0.45–4.5)
WBC # BLD AUTO: 7.1 THOUSAND/UL (ref 4.31–10.16)

## 2022-06-13 PROCEDURE — 80053 COMPREHEN METABOLIC PANEL: CPT

## 2022-06-13 PROCEDURE — 80061 LIPID PANEL: CPT

## 2022-06-13 PROCEDURE — 85025 COMPLETE CBC W/AUTO DIFF WBC: CPT

## 2022-06-13 PROCEDURE — 36415 COLL VENOUS BLD VENIPUNCTURE: CPT

## 2022-06-13 PROCEDURE — 84443 ASSAY THYROID STIM HORMONE: CPT

## 2022-06-16 ENCOUNTER — OFFICE VISIT (OUTPATIENT)
Dept: INTERNAL MEDICINE CLINIC | Age: 52
End: 2022-06-16
Payer: COMMERCIAL

## 2022-06-16 ENCOUNTER — DOCUMENTATION (OUTPATIENT)
Dept: INTERNAL MEDICINE CLINIC | Age: 52
End: 2022-06-16

## 2022-06-16 VITALS
TEMPERATURE: 98 F | SYSTOLIC BLOOD PRESSURE: 118 MMHG | HEART RATE: 67 BPM | WEIGHT: 189.1 LBS | DIASTOLIC BLOOD PRESSURE: 76 MMHG | OXYGEN SATURATION: 98 % | HEIGHT: 62 IN | BODY MASS INDEX: 34.8 KG/M2

## 2022-06-16 DIAGNOSIS — Z11.4 SCREENING FOR HIV (HUMAN IMMUNODEFICIENCY VIRUS): ICD-10-CM

## 2022-06-16 DIAGNOSIS — Z11.59 NEED FOR HEPATITIS C SCREENING TEST: ICD-10-CM

## 2022-06-16 DIAGNOSIS — Z00.00 ANNUAL PHYSICAL EXAM: Primary | ICD-10-CM

## 2022-06-16 DIAGNOSIS — S90.122A CONTUSION OF TOE OF LEFT FOOT, UNSPECIFIED TOE, INITIAL ENCOUNTER: ICD-10-CM

## 2022-06-16 PROCEDURE — 90715 TDAP VACCINE 7 YRS/> IM: CPT

## 2022-06-16 PROCEDURE — 3008F BODY MASS INDEX DOCD: CPT | Performed by: INTERNAL MEDICINE

## 2022-06-16 PROCEDURE — 90471 IMMUNIZATION ADMIN: CPT

## 2022-06-16 PROCEDURE — 99396 PREV VISIT EST AGE 40-64: CPT | Performed by: INTERNAL MEDICINE

## 2022-06-16 PROCEDURE — 1036F TOBACCO NON-USER: CPT | Performed by: INTERNAL MEDICINE

## 2022-06-16 NOTE — PATIENT INSTRUCTIONS
Wellness Visit for Adults   AMBULATORY CARE:   A wellness visit  is when you see your healthcare provider to get screened for health problems  Your healthcare provider will also give you advice on how to stay healthy  Write down your questions so you remember to ask them  Ask your healthcare provider how often you should have a wellness visit  What happens at a wellness visit:  Your healthcare provider will ask about your health, and your family history of health problems  This includes high blood pressure, heart disease, and cancer  He or she will ask if you have symptoms that concern you, if you smoke, and about your mood  You may also be asked about your intake of medicines, supplements, food, and alcohol  Any of the following may be done:  · Your weight  will be checked  Your height may also be checked so your body mass index (BMI) can be calculated  Your BMI shows if you are at a healthy weight  · Your blood pressure  and heart rate will be checked  Your temperature may also be checked  · Blood and urine tests  may be done  Blood tests may be done to check your cholesterol levels  Abnormal cholesterol levels increase your risk for heart disease and stroke  You may also need a blood or urine test to check for diabetes if you are at increased risk  Urine tests may be done to look for signs of an infection or kidney disease  · A physical exam  includes checking your heartbeat and lungs with a stethoscope  Your healthcare provider may also check your skin to look for sun damage  · Screening tests  may be recommended  A screening test is done to check for diseases that may not cause symptoms  The screening tests you may need depend on your age, gender, family history, and lifestyle habits  For example, colorectal screening may be recommended if you are 48years old or older  Screening tests you need if you are a woman:   · A Pap smear  is used to screen for cervical cancer   Pap smears are usually done every 3 to 5 years depending on your age  You may need them more often if you have had abnormal Pap smear test results in the past  Ask your healthcare provider how often you should have a Pap smear  · A mammogram  is an x-ray of your breasts to screen for breast cancer  Experts recommend mammograms every 2 years starting at age 48 years  You may need a mammogram at age 52 years or younger if you have an increased risk for breast cancer  Talk to your healthcare provider about when you should start having mammograms and how often you need them  Vaccines you may need:   · Get an influenza vaccine  every year  The influenza vaccine protects you from the flu  Several types of viruses cause the flu  The viruses change over time, so new vaccines are made each year  · Get a tetanus-diphtheria (Td) booster vaccine  every 10 years  This vaccine protects you against tetanus and diphtheria  Tetanus is a severe infection that may cause painful muscle spasms and lockjaw  Diphtheria is a severe bacterial infection that causes a thick covering in the back of your mouth and throat  · Get a human papillomavirus (HPV) vaccine  if you are female and aged 23 to 32 or male 23 to 24 and never received it  This vaccine protects you from HPV infection  HPV is the most common infection spread by sexual contact  HPV may also cause vaginal, penile, and anal cancers  · Get a pneumococcal vaccine  if you are aged 72 years or older  The pneumococcal vaccine is an injection given to protect you from pneumococcal disease  Pneumococcal disease is an infection caused by pneumococcal bacteria  The infection may cause pneumonia, meningitis, or an ear infection  · Get a shingles vaccine  if you are 60 or older, even if you have had shingles before  The shingles vaccine is an injection to protect you from the varicella-zoster virus  This is the same virus that causes chickenpox   Shingles is a painful rash that develops in people who had chickenpox or have been exposed to the virus  How to eat healthy:  My Plate is a model for planning healthy meals  It shows the types and amounts of foods that should go on your plate  Fruits and vegetables make up about half of your plate, and grains and protein make up the other half  A serving of dairy is included on the side of your plate  The amount of calories and serving sizes you need depends on your age, gender, weight, and height  Examples of healthy foods are listed below:  · Eat a variety of vegetables  such as dark green, red, and orange vegetables  You can also include canned vegetables low in sodium (salt) and frozen vegetables without added butter or sauces  · Eat a variety of fresh fruits , canned fruit in 100% juice, frozen fruit, and dried fruit  · Include whole grains  At least half of the grains you eat should be whole grains  Examples include whole-wheat bread, wheat pasta, brown rice, and whole-grain cereals such as oatmeal     · Eat a variety of protein foods such as seafood (fish and shellfish), lean meat, and poultry without skin (turkey and chicken)  Examples of lean meats include pork leg, shoulder, or tenderloin, and beef round, sirloin, tenderloin, and extra lean ground beef  Other protein foods include eggs and egg substitutes, beans, peas, soy products, nuts, and seeds  · Choose low-fat dairy products such as skim or 1% milk or low-fat yogurt, cheese, and cottage cheese  · Limit unhealthy fats  such as butter, hard margarine, and shortening  Exercise:  Exercise at least 30 minutes per day on most days of the week  Some examples of exercise include walking, biking, dancing, and swimming  You can also fit in more physical activity by taking the stairs instead of the elevator or parking farther away from stores  Include muscle strengthening activities 2 days each week  Regular exercise provides many health benefits   It helps you manage your weight, and decreases your risk for type 2 diabetes, heart disease, stroke, and high blood pressure  Exercise can also help improve your mood  Ask your healthcare provider about the best exercise plan for you  General health and safety guidelines:   · Do not smoke  Nicotine and other chemicals in cigarettes and cigars can cause lung damage  Ask your healthcare provider for information if you currently smoke and need help to quit  E-cigarettes or smokeless tobacco still contain nicotine  Talk to your healthcare provider before you use these products  · Limit alcohol  A drink of alcohol is 12 ounces of beer, 5 ounces of wine, or 1½ ounces of liquor  · Lose weight, if needed  Being overweight increases your risk of certain health conditions  These include heart disease, high blood pressure, type 2 diabetes, and certain types of cancer  · Protect your skin  Do not sunbathe or use tanning beds  Use sunscreen with a SPF 15 or higher  Apply sunscreen at least 15 minutes before you go outside  Reapply sunscreen every 2 hours  Wear protective clothing, hats, and sunglasses when you are outside  · Drive safely  Always wear your seatbelt  Make sure everyone in your car wears a seatbelt  A seatbelt can save your life if you are in an accident  Do not use your cell phone when you are driving  This could distract you and cause an accident  Pull over if you need to make a call or send a text message  · Practice safe sex  Use latex condoms if are sexually active and have more than one partner  Your healthcare provider may recommend screening tests for sexually transmitted infections (STIs)  · Wear helmets, lifejackets, and protective gear  Always wear a helmet when you ride a bike or motorcycle, go skiing, or play sports that could cause a head injury  Wear protective equipment when you play sports  Wear a lifejacket when you are on a boat or doing water sports      © Copyright [employer] 2022 Information is for End User's use only and may not be sold, redistributed or otherwise used for commercial purposes  All illustrations and images included in CareNotes® are the copyrighted property of A D A M [employer]  or Sara Hebert  The above information is an  only  It is not intended as medical advice for individual conditions or treatments  Talk to your doctor, nurse or pharmacist before following any medical regimen to see if it is safe and effective for you  Weight Management   AMBULATORY CARE:   Why it is important to manage your weight:  Being overweight increases your risk of health conditions such as heart disease, high blood pressure, type 2 diabetes, and certain types of cancer  It can also increase your risk for osteoarthritis, sleep apnea, and other respiratory problems  Aim for a slow, steady weight loss  Even a small amount of weight loss can lower your risk of health problems  Risks of being overweight:  Extra weight can cause many health problems, including the following:  · Diabetes (high blood sugar level)    · High blood pressure or high cholesterol    · Heart disease    · Stroke    · Gallbladder or liver disease    · Cancer of the colon, breast, prostate, liver, or kidney    · Sleep apnea    · Arthritis or gout    Screening  is done to check for health conditions before you have signs or symptoms  If you are 28to 79years old, your blood sugar level may be checked every 3 years for signs of prediabetes or diabetes  Your healthcare provider will check your blood pressure at each visit  High blood pressure can lead to a stroke or other problems  Your provider may check for signs of heart disease, cancer, or other health problems  How to lose weight safely:  A safe and healthy way to lose weight is to eat fewer calories and get regular exercise  · You can lose up about 1 pound a week by decreasing the number of calories you eat by 500 calories each day   You can decrease calories by eating smaller portion sizes or by cutting out high-calorie foods  Read labels to find out how many calories are in the foods you eat  · You can also burn calories with exercise such as walking, swimming, or biking  You will be more likely to keep weight off if you make these changes part of your lifestyle  Exercise at least 30 minutes per day on most days of the week  You can also fit in more physical activity by taking the stairs instead of the elevator or parking farther away from stores  Ask your healthcare provider about the best exercise plan for you  Healthy meal plan for weight management:  A healthy meal plan includes a variety of foods, contains fewer calories, and helps you stay healthy  A healthy meal plan includes the following:     · Eat whole-grain foods more often  A healthy meal plan should contain fiber  Fiber is the part of grains, fruits, and vegetables that is not broken down by your body  Whole-grain foods are healthy and provide extra fiber in your diet  Some examples of whole-grain foods are whole-wheat breads and pastas, oatmeal, brown rice, and bulgur  · Eat a variety of vegetables every day  Include dark, leafy greens such as spinach, kale, nicholas greens, and mustard greens  Eat yellow and orange vegetables such as carrots, sweet potatoes, and winter squash  · Eat a variety of fruits every day  Choose fresh or canned fruit (canned in its own juice or light syrup) instead of juice  Fruit juice has very little or no fiber  · Eat low-fat dairy foods  Drink fat-free (skim) milk or 1% milk  Eat fat-free yogurt and low-fat cottage cheese  Try low-fat cheeses such as mozzarella and other reduced-fat cheeses  · Choose meat and other protein foods that are low in fat  Choose beans or other legumes such as split peas or lentils  Choose fish, skinless poultry (chicken or turkey), or lean cuts of red meat (beef or pork)   Before you cook meat or poultry, cut off any visible fat  · Use less fat and oil  Try baking foods instead of frying them  Add less fat, such as margarine, sour cream, regular salad dressing and mayonnaise to foods  Eat fewer high-fat foods  Some examples of high-fat foods include french fries, doughnuts, ice cream, and cakes  · Eat fewer sweets  Limit foods and drinks that are high in sugar  This includes candy, cookies, regular soda, and sweetened drinks  Ways to decrease calories:   · Eat smaller portions  ? Use a small plate with smaller servings  ? Do not eat second helpings  ? When you eat at a restaurant, ask for a box and place half of your meal in the box before you eat  ? Share an entrée with someone else  · Replace high-calorie snacks with healthy, low-calorie snacks  ? Choose fresh fruit, vegetables, fat-free rice cakes, or air-popped popcorn instead of potato chips, nuts, or chocolate  ? Choose water or calorie-free drinks instead of soda or sweetened drinks  · Do not shop for groceries when you are hungry  You may be more likely to make unhealthy food choices  Take a grocery list of healthy foods and shop after you have eaten  · Eat regular meals  Do not skip meals  Skipping meals can lead to overeating later in the day  This can make it harder for you to lose weight  Eat a healthy snack in place of a meal if you do not have time to eat a regular meal  Talk with a dietitian to help you create a meal plan and schedule that is right for you  Other things to consider as you try to lose weight:   · Be aware of situations that may give you the urge to overeat, such as eating while watching television  Find ways to avoid these situations  For example, read a book, go for a walk, or do crafts  · Meet with a weight loss support group or friends who are also trying to lose weight  This may help you stay motivated to continue working on your weight loss goals      © Copyright [employer] 2022 Information is for End User's use only and may not be sold, redistributed or otherwise used for commercial purposes  All illustrations and images included in CareNotes® are the copyrighted property of A [employer] M , Inc  or Sara Hebert  The above information is an  only  It is not intended as medical advice for individual conditions or treatments  Talk to your doctor, nurse or pharmacist before following any medical regimen to see if it is safe and effective for you

## 2022-06-16 NOTE — PROGRESS NOTES
ADULT ANNUAL [address] [facility]    NAME: Sandrine Mills  AGE: 49 y o  SEX: female  : 1970     DATE: 2022     Assessment and Plan:     Problem List Items Addressed This Visit    None         Immunizations and preventive care screenings were discussed with patient today  Appropriate education was printed on patient's after visit summary  Counseling:  Alcohol/drug use: discussed moderation in alcohol intake, the recommendations for healthy alcohol use, and avoidance of illicit drug use  Dental Health: discussed importance of regular tooth brushing, flossing, and dental visits  Injury prevention: discussed safety/seat belts, safety helmets, smoke detectors, carbon dioxide detectors, and smoking near bedding or upholstery  Sexual health: discussed sexually transmitted diseases, partner selection, use of condoms, avoidance of unintended pregnancy, and contraceptive alternatives  · Exercise: the importance of regular exercise/physical activity was discussed  Recommend exercise 3-5 times per week for at least 30 minutes  No follow-ups on file  Chief Complaint:     Chief Complaint   Patient presents with    Annual Exam     Yearly physical        History of Present Illness:     Adult Annual Physical   Patient here for a comprehensive physical exam  The patient reports no problems  Contusion of the left small toe  Diet and Physical Activity  · Diet/Nutrition: well balanced diet and limited junk food  · Exercise: no formal exercise  Depression Screening  PHQ-2/9 Depression Screening         General Health  · Sleep: sleeps well  · Hearing: normal - bilateral   · Vision: wears glasses  · Dental: regular dental visits  /GYN Health  · Patient is: postmenopausal  · Last menstrual period:  None  · Contraceptive method: none       Review of Systems:     Review of Systems   Constitutional: Negative for chills and fatigue  HENT: Negative for congestion, ear pain, hearing loss, postnasal drip, sinus pressure, sore throat and voice change  Eyes: Negative for pain, discharge and visual disturbance  Respiratory: Negative for cough, chest tightness and shortness of breath  Cardiovascular: Negative for chest pain, palpitations and leg swelling  Gastrointestinal: Negative for abdominal pain, blood in stool, diarrhea, nausea and rectal pain  Genitourinary: Negative for difficulty urinating, dysuria and urgency  Musculoskeletal: Negative for arthralgias and joint swelling  Skin: Negative for rash  Allergic/Immunologic: Negative for environmental allergies and food allergies  Neurological: Negative for dizziness, tremors, weakness, numbness and headaches  Hematological: Negative for adenopathy  Psychiatric/Behavioral: Negative for behavioral problems and hallucinations  Past Medical History:     Past Medical History:   Diagnosis Date    Abnormal mammogram     Benign tumor of breast     left breast    Dysfunctional uterine bleeding     Resolved: 11/9/2017    Skin lesion of scalp     last assessed: 11/9/2017      Past Surgical History:     Past Surgical History:   Procedure Laterality Date    APPENDECTOMY      BREAST BIOPSY Left     BREAST SURGERY Left 09/2012    bx    MAMMO STEREOTACTIC BREAST BIOPSY LEFT (ALL INC) Left 09/25/2012    NE COLONOSCOPY FLX DX W/COLLJ SPEC WHEN PFRMD N/A 7/20/2016    Procedure: COLONOSCOPY;  Surgeon: Leanne Nieto MD;  Location: [facility];   Service: Gastroenterology      Social History:     Social History     Socioeconomic History    Marital status: /Civil Union     Spouse name: None    Number of children: None    Years of education: None    Highest education level: None   Occupational History    None   Tobacco Use    Smoking status: Never Smoker    Smokeless tobacco: Never Used    Tobacco comment: Never Smoked   Vaping Use    Vaping Use: Never used   Substance and Sexual Activity    Alcohol use: Yes     Alcohol/week: 2 0 standard drinks     Types: 2 Standard drinks or equivalent per week     Comment: Drink Occassionaly    Drug use: No    Sexual activity: Yes     Partners: Male     Birth control/protection: Abstinence, None   Other Topics Concern    None   Social History Narrative    Caffeine use    Exercising regularly     Social Determinants of Health     Financial Resource Strain: Not on file   Food Insecurity: Not on file   Transportation Needs: Not on file   Physical Activity: Not on file   Stress: Not on file   Social Connections: Not on file   Intimate Partner Violence: Not on file   Housing Stability: Not on file      Family History:     Family History   Problem Relation Age of Onset    Breast cancer Mother 72    Lung cancer Mother     Liver cancer Mother     Cancer Mother     Cancer Father     Colon cancer Father 76    Liver cancer Father     Bone cancer Father     Diabetes Father     No Known Problems Maternal Grandmother     No Known Problems Maternal Grandfather     No Known Problems Paternal Grandmother     No Known Problems Paternal Grandfather     Lung cancer Brother     Kidney cancer Brother     No Known Problems Maternal Aunt     Breast cancer Paternal Aunt 67      Current Medications:     Current Outpatient Medications   Medication Sig Dispense Refill    Multiple Vitamin (MULTIVITAMIN) tablet Take 2 tablets by mouth daily   nystatin (MYCOSTATIN) ointment Bid x 14 days (Patient taking differently: Apply topically if needed) 30 g 0     No current facility-administered medications for this visit        Allergies:     No Known Allergies   Physical Exam:     /76 (BP Location: Left arm, Patient Position: Sitting, Cuff Size: Standard)   Pulse 67   Temp 98 °F (36 7 °C) (Temporal)   Ht 5' 1 5" (1 562 m)   Wt 85 8 kg (189 lb 1 6 oz)   LMP 05/01/2018 (Approximate)   SpO2 98% Comment: room air  BMI 35 15 kg/m²     Physical Exam  Vitals and nursing note reviewed  Constitutional:       General: She is not in acute distress  Appearance: She is well-developed  She is obese  HENT:      Head: Normocephalic and atraumatic  Eyes:      Conjunctiva/sclera: Conjunctivae normal    Cardiovascular:      Rate and Rhythm: Normal rate and regular rhythm  Heart sounds: No murmur heard  Pulmonary:      Effort: Pulmonary effort is normal  No respiratory distress  Breath sounds: Normal breath sounds  Abdominal:      Palpations: Abdomen is soft  Tenderness: There is no abdominal tenderness  Musculoskeletal:      Cervical back: Neck supple  Legs:    Skin:     General: Skin is warm and dry  Neurological:      Mental Status: She is alert            Marsha Oppenheim, MD  [address]

## 2022-11-14 ENCOUNTER — TELEPHONE (OUTPATIENT)
Dept: OBGYN CLINIC | Facility: CLINIC | Age: 52
End: 2022-11-14

## 2023-02-02 ENCOUNTER — TELEPHONE (OUTPATIENT)
Dept: OBGYN CLINIC | Facility: CLINIC | Age: 53
End: 2023-02-02

## 2023-02-28 ENCOUNTER — HOSPITAL ENCOUNTER (OUTPATIENT)
Dept: RADIOLOGY | Facility: HOSPITAL | Age: 53
Discharge: HOME | End: 2023-02-28

## 2023-02-28 DIAGNOSIS — Z91.89 AT HIGH RISK FOR BREAST CANCER: ICD-10-CM

## 2023-02-28 RX ADMIN — GADOBUTROL 8 ML: 604.72 INJECTION INTRAVENOUS at 18:53

## 2023-03-23 NOTE — PROGRESS NOTES
Patient presents for a routine annual visit    Last Pap Smear- 2020 -/-  Mammogram- 2022 [facility] 2 MRI 23 WNL  Colonoscopy- 2021 RTO 5    Non smoker  Social drinker  Currently sexually active  Breast cancer in family  No concerns/questions for today's visit

## 2023-03-23 NOTE — PROGRESS NOTES
Assessment   47 y o  Heather Roa presenting for annual exam      Plan   Diagnoses and all orders for this visit:    Well woman exam with routine gynecological exam    Screening mammogram for breast cancer  -     Mammo screening bilateral w 3d & cad; Future    Dense breast tissue on mammogram  -     MRI breast bilateral w and wo contrast w cad; Future        Pap collected today  Mammo slip given  Breast MRI slip given  Lifetime risk >20% and dense breast tissue  She will call insurance to determine this is covered again   Colonoscopy up to date    SBE encouraged, A yearly mammogram is recommended for breast cancer screening starting at age 36  ASCCP guidelines reviewed  Calcium/ Vit D dietary requirements discussed  Advised to call with any issues, all concerns & questions addressed  See provided information in your after visit summary     F/U Annually and PRN    Results will be released to [employer], if abnormal will call or message to review and discuss treatment plan      __________________________________________________________________    Subjective     Babar Knight is a 47 y o  Heather Brine presenting for annual exam  She is without complaint       She did have postmenopausal spotting in  and   She had a negative EMB  Pelvic US 22 with 4mm endometrium  No recurrence of VB  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    SCREENING  Last Pap: 3/2020 neg/neg  Last Mammo: 2022 birads 2; had breast MRI 2023 birads 2  Last Colonoscopy: 2021 5 year recall      GYN  Sexually active: Yes - single partner - male    Hx Abnormal pap: denies  We reviewed ASCCP guidelines for Pap testing today  Denies vaginal discharge, itching, odor, dyspareunia, pelvic pain and vulvar/vaginal symptoms      OB           Complaints: denies   Denies urgency, frequency, hematuria, leakage / change in stream, difficulty urinating         BREAST  Complaints: denies   Denies: breast lump, breast tenderness, nipple discharge, skin color change, and skin lesion(s)      Pertinent Family Hx:   Family hx of breast cancer: mom (66), paternal aunt (73)- gene status unknown  Pt not interested in genetics onc referral at this time  Family hx of ovarian cancer: no  Family hx of colon cancer: dad (77)      GENERAL  PMH reviewed/updated and is as below  Patient does follow with a PCP  SOCIAL  Smoking: no  Alcohol:social  Drug: no  Occupation: works at [employer]      Past Medical History:   Diagnosis Date   • Abnormal mammogram    • Benign tumor of breast     left breast   • Dysfunctional uterine bleeding     Resolved: 11/9/2017   • Skin lesion of scalp     last assessed: 11/9/2017       Past Surgical History:   Procedure Laterality Date   • APPENDECTOMY     • BREAST BIOPSY Left    • BREAST SURGERY Left 09/2012    bx   • MAMMO STEREOTACTIC BREAST BIOPSY LEFT (ALL INC) Left 09/25/2012   • TN COLONOSCOPY FLX DX W/COLLJ SPEC WHEN PFRMD N/A 7/20/2016    Procedure: COLONOSCOPY;  Surgeon: Delphine Vicente MD;  Location: [facility]; Service: Gastroenterology         Current Outpatient Medications:   •  Multiple Vitamin (MULTIVITAMIN) tablet, Take 2 tablets by mouth daily  , Disp: , Rfl:   •  nystatin (MYCOSTATIN) ointment, Bid x 14 days (Patient taking differently: Apply topically if needed), Disp: 30 g, Rfl: 0    No Known Allergies    Social History     Socioeconomic History   • Marital status: /Civil Union     Spouse name: Not on file   • Number of children: Not on file   • Years of education: Not on file   • Highest education level: Not on file   Occupational History   • Not on file   Tobacco Use   • Smoking status: Never   • Smokeless tobacco: Never   • Tobacco comments:     Never Smoked   Vaping Use   • Vaping Use: Never used   Substance and Sexual Activity   • Alcohol use:  Yes     Alcohol/week: 2 0 standard drinks     Types: 2 Standard drinks or equivalent per week     Comment: Drink Occassionaly • Drug use: No   • Sexual activity: Yes     Partners: Male     Birth control/protection: Abstinence, None   Other Topics Concern   • Not on file   Social History Narrative    Caffeine use    Exercising regularly     Social Determinants of Health     Financial Resource Strain: Not on file   Food Insecurity: Not on file   Transportation Needs: Not on file   Physical Activity: Not on file   Stress: Not on file   Social Connections: Not on file   Intimate Partner Violence: Not on file   Housing Stability: Not on file       Review of Systems     ROS:  Constitutional: Negative for fatigue and unexpected weight change  Respiratory: Negative for cough and shortness of breath  Cardiovascular: Negative for chest pain and palpitations  Gastrointestinal: Negative for abdominal pain and change in bowel habits  Breasts:  Negative, other than as noted above  Genitourinary: Negative, other than as noted above  Psychiatric: Negative for mood difficulties  Objective         Vitals:    03/24/23 0708   BP: (P) 112/78       Physical Examination:    Patient appears well and is not in distress  Neck is supple without masses, no cervical or supraclavicular lymphadenopathy  Cardiovascular: regular rate and rhythm; no murmurs  Lungs: clear to auscultation bilaterally; no wheezes  Breasts are symmetrical without mass, tenderness, nipple discharge, skin changes or adenopathy  Abdomen is soft and nontender without masses  External genitals are normal without lesions or rashes  Urethral meatus and urethra are normal  Bladder is normal to palpation  Vagina is normal without discharge or bleeding  Cervix is normal without discharge or lesion  Uterus is normal, mobile, nontender without palpable mass  Adnexa are normal, nontender, without palpable mass

## 2023-03-24 ENCOUNTER — ANNUAL EXAM (OUTPATIENT)
Dept: OBGYN CLINIC | Facility: CLINIC | Age: 53
End: 2023-03-24

## 2023-03-24 DIAGNOSIS — Z12.4 ENCOUNTER FOR PAPANICOLAOU SMEAR FOR CERVICAL CANCER SCREENING: ICD-10-CM

## 2023-03-24 DIAGNOSIS — Z01.419 WELL WOMAN EXAM WITH ROUTINE GYNECOLOGICAL EXAM: Primary | ICD-10-CM

## 2023-03-24 DIAGNOSIS — Z12.31 SCREENING MAMMOGRAM FOR BREAST CANCER: ICD-10-CM

## 2023-03-24 DIAGNOSIS — R92.2 DENSE BREAST TISSUE ON MAMMOGRAM: ICD-10-CM

## 2023-03-31 LAB
LAB AP GYN PRIMARY INTERPRETATION: NORMAL
Lab: NORMAL

## 2023-05-19 ENCOUNTER — HOSPITAL ENCOUNTER (OUTPATIENT)
Dept: RADIOLOGY | Age: 53
Discharge: HOME | End: 2023-05-19

## 2023-05-19 VITALS — WEIGHT: 190 LBS | HEIGHT: 60 IN | BODY MASS INDEX: 37.3 KG/M2

## 2023-05-19 DIAGNOSIS — Z12.31 SCREENING MAMMOGRAM FOR BREAST CANCER: ICD-10-CM

## 2023-06-16 ENCOUNTER — APPOINTMENT (OUTPATIENT)
Dept: LAB | Age: 53
End: 2023-06-16
Payer: COMMERCIAL

## 2023-06-16 DIAGNOSIS — Z11.4 SCREENING FOR HIV (HUMAN IMMUNODEFICIENCY VIRUS): ICD-10-CM

## 2023-06-16 DIAGNOSIS — Z00.00 ANNUAL PHYSICAL EXAM: ICD-10-CM

## 2023-06-16 DIAGNOSIS — Z11.59 NEED FOR HEPATITIS C SCREENING TEST: ICD-10-CM

## 2023-06-16 LAB
ALBUMIN SERPL BCP-MCNC: 3.6 G/DL (ref 3.5–5)
ALP SERPL-CCNC: 98 U/L (ref 46–116)
ALT SERPL W P-5'-P-CCNC: 40 U/L (ref 12–78)
ANION GAP SERPL CALCULATED.3IONS-SCNC: 5 MMOL/L (ref 4–13)
AST SERPL W P-5'-P-CCNC: 21 U/L (ref 5–45)
BILIRUB SERPL-MCNC: 0.24 MG/DL (ref 0.2–1)
BUN SERPL-MCNC: 20 MG/DL (ref 5–25)
CALCIUM SERPL-MCNC: 9.4 MG/DL (ref 8.3–10.1)
CHLORIDE SERPL-SCNC: 111 MMOL/L (ref 96–108)
CHOLEST SERPL-MCNC: 193 MG/DL
CO2 SERPL-SCNC: 24 MMOL/L (ref 21–32)
CREAT SERPL-MCNC: 0.8 MG/DL (ref 0.6–1.3)
ERYTHROCYTE [DISTWIDTH] IN BLOOD BY AUTOMATED COUNT: 12.2 % (ref 11.6–15.1)
GFR SERPL CREATININE-BSD FRML MDRD: 85 ML/MIN/1.73SQ M
GLUCOSE P FAST SERPL-MCNC: 95 MG/DL (ref 65–99)
HCT VFR BLD AUTO: 41.6 % (ref 34.8–46.1)
HCV AB SER QL: NORMAL
HDLC SERPL-MCNC: 43 MG/DL
HGB BLD-MCNC: 13.7 G/DL (ref 11.5–15.4)
LDLC SERPL CALC-MCNC: 121 MG/DL (ref 0–100)
MCH RBC QN AUTO: 28.5 PG (ref 26.8–34.3)
MCHC RBC AUTO-ENTMCNC: 32.9 G/DL (ref 31.4–37.4)
MCV RBC AUTO: 87 FL (ref 82–98)
NONHDLC SERPL-MCNC: 150 MG/DL
PLATELET # BLD AUTO: 247 THOUSANDS/UL (ref 149–390)
PMV BLD AUTO: 10.3 FL (ref 8.9–12.7)
POTASSIUM SERPL-SCNC: 4.2 MMOL/L (ref 3.5–5.3)
PROT SERPL-MCNC: 7.1 G/DL (ref 6.4–8.4)
RBC # BLD AUTO: 4.8 MILLION/UL (ref 3.81–5.12)
SODIUM SERPL-SCNC: 140 MMOL/L (ref 135–147)
TRIGL SERPL-MCNC: 146 MG/DL
TSH SERPL DL<=0.05 MIU/L-ACNC: 1.82 UIU/ML (ref 0.45–4.5)
WBC # BLD AUTO: 8.5 THOUSAND/UL (ref 4.31–10.16)

## 2023-06-16 PROCEDURE — 86803 HEPATITIS C AB TEST: CPT

## 2023-06-16 PROCEDURE — 84443 ASSAY THYROID STIM HORMONE: CPT

## 2023-06-16 PROCEDURE — 36415 COLL VENOUS BLD VENIPUNCTURE: CPT

## 2023-06-16 PROCEDURE — 80061 LIPID PANEL: CPT

## 2023-06-16 PROCEDURE — 80053 COMPREHEN METABOLIC PANEL: CPT

## 2023-06-16 PROCEDURE — 85027 COMPLETE CBC AUTOMATED: CPT

## 2023-06-16 PROCEDURE — 87389 HIV-1 AG W/HIV-1&-2 AB AG IA: CPT

## 2023-06-17 LAB
HIV 1+2 AB+HIV1 P24 AG SERPL QL IA: NORMAL
HIV 2 AB SERPL QL IA: NORMAL
HIV1 AB SERPL QL IA: NORMAL
HIV1 P24 AG SERPL QL IA: NORMAL

## 2023-06-22 ENCOUNTER — OFFICE VISIT (OUTPATIENT)
Dept: INTERNAL MEDICINE CLINIC | Age: 53
End: 2023-06-22
Payer: COMMERCIAL

## 2023-06-22 VITALS
HEIGHT: 62 IN | WEIGHT: 192.5 LBS | SYSTOLIC BLOOD PRESSURE: 116 MMHG | BODY MASS INDEX: 35.43 KG/M2 | HEART RATE: 58 BPM | OXYGEN SATURATION: 98 % | DIASTOLIC BLOOD PRESSURE: 80 MMHG | TEMPERATURE: 98.2 F

## 2023-06-22 DIAGNOSIS — B36.9 FUNGAL DERMATITIS: ICD-10-CM

## 2023-06-22 DIAGNOSIS — Z00.00 ANNUAL PHYSICAL EXAM: Primary | ICD-10-CM

## 2023-06-22 DIAGNOSIS — E78.49 OTHER HYPERLIPIDEMIA: ICD-10-CM

## 2023-06-22 PROCEDURE — 99396 PREV VISIT EST AGE 40-64: CPT | Performed by: INTERNAL MEDICINE

## 2023-06-22 RX ORDER — NYSTATIN 100000 [USP'U]/G
POWDER TOPICAL 3 TIMES DAILY
Qty: 60 G | Refills: 1 | Status: SHIPPED | OUTPATIENT
Start: 2023-06-22

## 2023-06-22 RX ORDER — NYSTATIN 100000 U/G
OINTMENT TOPICAL 2 TIMES DAILY PRN
Qty: 30 G | Status: CANCELLED | OUTPATIENT
Start: 2023-06-22

## 2023-06-22 NOTE — PROGRESS NOTES
Assessment/Plan:    Annual physical examination  - History and physical examination done  - Pt was counseled to eat a heart healthy diet, to drink at least 2 L of water daily, to take a daily multivitamin and to exercise for at least 30 minutes of cardio exercise daily, for at least 5 days a week  - CBC, CMP, TSH and lipid panel were done on 6/16/2023 and all results were reviewed with patient and all questions answered   -She is up-to-date with 3 COVID vaccines and the Tdap   -She is up-to-date with her Pap smear, mammogram and colonoscopy  - follow up in 1 year for an annual physical exam or sooner as needed  Fungal dermatitis/Candida intertrigo  -We will prescribe nystatin powder  -Patient has been using nystatin ointment and was counseled to try the powder and to call the office if her symptoms do not resolve and we will represcribe the ointment for her  Hyperlipidemia  -Calculated 10-year ASCVD risk is 1 4%  -Patient was counseled to eat a heart healthy diet and exercise and we will recheck a lipid panel next year  The 10-year ASCVD risk score (Van DK, et al , 2019) is: 1 4%    Values used to calculate the score:      Age: 47 years      Sex: Female      Is Non- : No      Diabetic: No      Tobacco smoker: No      Systolic Blood Pressure: 570 mmHg      Is BP treated: No      HDL Cholesterol: 43 mg/dL      Total Cholesterol: 193 mg/dL       Diagnoses and all orders for this visit:    Annual physical exam    Fungal dermatitis  -     nystatin (MYCOSTATIN) powder; Apply topically 3 (three) times a day    Other hyperlipidemia    BMI 35 0-35 9,adult          BMI Counseling: Body mass index is 35 42 kg/m²  The BMI is above normal  Nutrition recommendations include consuming healthier snacks, limiting drinks that contain sugar, reducing intake of saturated and trans fat and reducing intake of cholesterol   Exercise recommendations include moderate physical activity 150 minutes/week  No pharmacotherapy was ordered  Patient referred to PCP  Rationale for BMI follow-up plan is due to patient being overweight or obese  Depression Screening and Follow-up Plan: Patient was screened for depression during today's encounter  They screened negative with a PHQ-2 score of 0  Subjective:       Patient ID: Joo Hannah is a 47 y o  female  HPI    Patient presents for an annual physical exam     Last annual physical exam- 6/16/22    Past medical history-Post menopausal bleeding,  Pedal edema, obesity     Past surgical history-appendectomy, wisdom teeth extraction    Medications- multivitamins    Allergies-NKDA    Diet- mixture of balanced diet and junk, drinks about one bottle of water daily    Exercise- walking     Alcohol use- 2 drinks a week    Caffeine and soda use- iced tea and soda    Nicotine use- never    Recreational drug use- none    Work-  for     Sexual history, STD history and HIV testing-monogamous with , STD hx -  Never, hiv testing - never    Gynecological history/Prostate health/testicular health history-LMP - at age [age], last pap smear - Feb 2023, last mammogram - last month    Colonoscopy-9/1/22 and normal and told to return in 11 years 2/2 family hx of colon ca    Immunization history- up to date with 2 COVID vaccines, tdap - 6/16/22    Dental visit-every 6 months    Vision-myopia - glasses    Family history-  colon ca - dad(diagnosed in his 62s),   DM - dad,   breast cancer and liver - mom  Liver and lung ca- brother( smoker)     Today, patient has no complaints  She admits to occasional diaphoresis and insomnia which occurs when her  is not there beside her because he has gone to his mother's house to take care of her    She denies fever, chills, headache, dizziness, nasal congestion, rhinorrhea, earache, sinus pain or pressure, postnasal drip, cough, chest pain, shortness of breath, wheezing, palpitations, nausea, vomiting, abdominal pain, diarrhea, constipation, hematochezia, hematuria, arthralgias, myalgias, feelings of anxiety or depression  The following portions of the patient's history were reviewed and updated as appropriate:   She  has a past medical history of Abnormal mammogram, Benign tumor of breast, Dysfunctional uterine bleeding, and Skin lesion of scalp  She   Patient Active Problem List    Diagnosis Date Noted   • Fungal dermatitis 06/22/2023   • Other hyperlipidemia 06/22/2023   • Family history of breast cancer in mother 01/27/2022   • Lesion of skin of breast 01/27/2022   • BMI 35 0-35 9,adult 06/16/2021   • Family history of colon cancer in father 06/16/2021   • Annual physical exam 03/16/2020   • Rash 10/11/2018   • Bilateral leg edema 08/27/2018   • Dyspnea on exertion 08/27/2018   • Skin lesion of scalp 11/09/2017     She  has a past surgical history that includes Appendectomy; pr colonoscopy flx dx w/collj spec when pfrmd (N/A, 07/20/2016); Mammo stereotactic breast biopsy left (all inc) (Left, 09/25/2012); Breast surgery (Left, 09/2012); and Breast biopsy (Left)  Her family history includes Bone cancer (age of onset: 58) in her father; Breast cancer in her mother and paternal aunt; Cancer in her brother, father, and mother; Colon cancer in her father; Diabetes in her father; Kidney cancer (age of onset: 54) in her brother; Liver cancer (age of onset: 58) in her father; Liver cancer (age of onset: 59) in her mother; Lung cancer (age of onset: 13) in her brother; Lung cancer (age of onset: 59) in her mother; No Known Problems in her maternal aunt, maternal grandfather, maternal grandmother, paternal grandfather, and paternal grandmother  She  reports that she has never smoked  She has never used smokeless tobacco  She reports current alcohol use of about 2 0 standard drinks of alcohol per week  She reports that she does not use drugs    Current Outpatient Medications   Medication Sig Dispense Refill   • "Multiple Vitamin (MULTIVITAMIN) tablet Take 2 tablets by mouth daily  • nystatin (MYCOSTATIN) powder Apply topically 3 (three) times a day 60 g 1     No current facility-administered medications for this visit  Current Outpatient Medications on File Prior to Visit   Medication Sig   • Multiple Vitamin (MULTIVITAMIN) tablet Take 2 tablets by mouth daily  • [DISCONTINUED] nystatin (MYCOSTATIN) ointment Bid x 14 days (Patient taking differently: Apply topically if needed)     No current facility-administered medications on file prior to visit  She has No Known Allergies       Review of Systems   Constitutional: Positive for diaphoresis (on and off)  Negative for activity change, chills, fatigue, fever and unexpected weight change  HENT: Negative for ear pain, postnasal drip, rhinorrhea, sinus pressure and sore throat  Eyes: Negative for pain  Respiratory: Negative for cough, choking, chest tightness, shortness of breath and wheezing  Cardiovascular: Negative for chest pain, palpitations and leg swelling  Gastrointestinal: Negative for abdominal pain, constipation, diarrhea, nausea and vomiting  Genitourinary: Negative for dysuria and hematuria  Musculoskeletal: Negative for arthralgias, back pain, gait problem, joint swelling, myalgias and neck stiffness  Skin: Positive for rash (Bilateral groin rash)  Negative for pallor  Neurological: Negative for dizziness, tremors, seizures, syncope, light-headedness and headaches  Hematological: Negative for adenopathy  Psychiatric/Behavioral: Positive for sleep disturbance  Negative for behavioral problems           Objective:      /80 (BP Location: Left arm, Patient Position: Sitting, Cuff Size: Standard)   Pulse 58   Temp 98 2 °F (36 8 °C) (Tympanic)   Ht 5' 1 81\" (1 57 m) Comment: wearing shoes  Wt 87 3 kg (192 lb 8 oz) Comment: wearing shoes  LMP 01/02/2018   SpO2 98%   BMI 35 42 kg/m²          Physical " Exam  Constitutional:       General: She is not in acute distress  Appearance: She is well-developed  She is not diaphoretic  HENT:      Head: Normocephalic and atraumatic  Right Ear: External ear normal       Left Ear: External ear normal       Nose: Nose normal       Mouth/Throat:      Mouth: Mucous membranes are dry  Pharynx: Posterior oropharyngeal erythema present  No oropharyngeal exudate  Eyes:      General: No scleral icterus  Right eye: No discharge  Left eye: No discharge  Conjunctiva/sclera: Conjunctivae normal       Pupils: Pupils are equal, round, and reactive to light  Neck:      Thyroid: No thyromegaly  Vascular: No JVD  Trachea: No tracheal deviation  Cardiovascular:      Rate and Rhythm: Normal rate and regular rhythm  Heart sounds: Normal heart sounds  No murmur heard  No friction rub  No gallop  Pulmonary:      Effort: Pulmonary effort is normal  No respiratory distress  Breath sounds: Normal breath sounds  No wheezing or rales  Chest:      Chest wall: No tenderness  Abdominal:      General: Bowel sounds are normal  There is no distension  Palpations: Abdomen is soft  There is no mass  Tenderness: There is no abdominal tenderness  There is no guarding or rebound  Musculoskeletal:         General: No tenderness or deformity  Normal range of motion  Cervical back: Normal range of motion and neck supple  Lymphadenopathy:      Cervical: No cervical adenopathy  Skin:     General: Skin is warm and dry  Coloration: Skin is not pale  Findings: No erythema or rash  Neurological:      Mental Status: She is alert and oriented to person, place, and time  Cranial Nerves: No cranial nerve deficit  Motor: No abnormal muscle tone  Coordination: Coordination normal       Deep Tendon Reflexes: Reflexes are normal and symmetric        Comments: Cranial nerves 2-12 are intact bilaterally  Muscle strength is 5/5 in all extremities  Sensation is intact in bilateral face and extremities  Rapid alternating movement and finger-to-nose pointing test intact   Deep tendon reflexes are 2+ bilaterally  Gait is intact       Psychiatric:         Behavior: Behavior normal            Appointment on 06/16/2023   Component Date Value Ref Range Status   • Cholesterol 06/16/2023 193  See Comment mg/dL Final    Cholesterol:         Pediatric <18 Years        Desirable          <170 mg/dL      Borderline High    170-199 mg/dL      High               >=200 mg/dL        Adult >=18 Years            Desirable         <200 mg/dL      Borderline High   200-239 mg/dL      High              >239 mg/dL     • Triglycerides 06/16/2023 146  See Comment mg/dL Final    Triglyceride:     0-9Y            <75mg/dL     10Y-17Y         <90 mg/dL       >=18Y     Normal          <150 mg/dL     Borderline High 150-199 mg/dL     High            200-499 mg/dL        Very High       >499 mg/dL    Specimen collection should occur prior to N-Acetylcysteine or Metamizole administration due to the potential for falsely depressed results  • HDL, Direct 06/16/2023 43 (L)  >=50 mg/dL Final    Specimen collection should occur prior to Metamizole administration due to the potential for falsley depressed results  • LDL Calculated 06/16/2023 121 (H)  0 - 100 mg/dL Final    LDL Cholesterol:     Optimal           <100 mg/dl     Near Optimal      100-129 mg/dl     Above Optimal       Borderline High 130-159 mg/dl       High            160-189 mg/dl       Very High       >189 mg/dl         This screening LDL is a calculated result  It does not have the accuracy of the Direct Measured LDL in the monitoring of patients with hyperlipidemia and/or statin therapy  Direct Measure LDL (DHO420) must be ordered separately in these patients     • Non-HDL-Chol (CHOL-HDL) 06/16/2023 150  mg/dl Final   • Hepatitis C Ab 06/16/2023 Non-reactive  Non-Reactive Final   • WBC 06/16/2023 8 50  4 31 - 10 16 Thousand/uL Final   • RBC 06/16/2023 4 80  3 81 - 5 12 Million/uL Final   • Hemoglobin 06/16/2023 13 7  11 5 - 15 4 g/dL Final   • Hematocrit 06/16/2023 41 6  34 8 - 46 1 % Final   • MCV 06/16/2023 87  82 - 98 fL Final   • MCH 06/16/2023 28 5  26 8 - 34 3 pg Final   • MCHC 06/16/2023 32 9  31 4 - 37 4 g/dL Final   • RDW 06/16/2023 12 2  11 6 - 15 1 % Final   • Platelets 00/29/1621 247  149 - 390 Thousands/uL Final   • MPV 06/16/2023 10 3  8 9 - 12 7 fL Final   • Sodium 06/16/2023 140  135 - 147 mmol/L Final   • Potassium 06/16/2023 4 2  3 5 - 5 3 mmol/L Final   • Chloride 06/16/2023 111 (H)  96 - 108 mmol/L Final   • CO2 06/16/2023 24  21 - 32 mmol/L Final   • ANION GAP 06/16/2023 5  4 - 13 mmol/L Final   • BUN 06/16/2023 20  5 - 25 mg/dL Final   • Creatinine 06/16/2023 0 80  0 60 - 1 30 mg/dL Final    Standardized to IDMS reference method   • Glucose, Fasting 06/16/2023 95  65 - 99 mg/dL Final    Specimen collection should occur prior to Sulfasalazine administration due to the potential for falsely depressed results  Specimen collection should occur prior to Sulfapyridine administration due to the potential for falsely elevated results  • Calcium 06/16/2023 9 4  8 3 - 10 1 mg/dL Final   • AST 06/16/2023 21  5 - 45 U/L Final    Specimen collection should occur prior to Sulfasalazine administration due to the potential for falsely depressed results  • ALT 06/16/2023 40  12 - 78 U/L Final    Specimen collection should occur prior to Sulfasalazine and/or Sulfapyridine administration due to the potential for falsely depressed results      • Alkaline Phosphatase 06/16/2023 98  46 - 116 U/L Final   • Total Protein 06/16/2023 7 1  6 4 - 8 4 g/dL Final   • Albumin 06/16/2023 3 6  3 5 - 5 0 g/dL Final   • Total Bilirubin 06/16/2023 0 24  0 20 - 1 00 mg/dL Final    Use of this assay is not recommended for patients undergoing treatment with eltrombopag due to the potential for falsely elevated results  • eGFR 06/16/2023 85  ml/min/1 73sq m Final   • TSH 3RD GENERATON 06/16/2023 1 816  0 450 - 4 500 uIU/mL Final    The recommended reference ranges for TSH during pregnancy are as follows:   First trimester 0 1 to 2 5 uIU/mL   Second trimester  0 2 to 3 0 uIU/mL   Third trimester 0 3 to 3 0 uIU/m    Note: Normal ranges may not apply to patients who are transgender, non-binary, or whose legal sex, sex at birth, and gender identity differ  Adult TSH (3rd generation) reference range follows the recommended guidelines of the [employer] Association, January, 2020  • HIV-1 p24 Antigen 06/16/2023 Non-Reactive  Non-Reactive Final   • HIV-1 Antibody 06/16/2023 Non-Reactive  Non-Reactive Final   • HIV-2 Antibody 06/16/2023 Non-Reactive  Non-Reactive Final   • HIV Ag-Ab 5th Gen 06/16/2023 Non-Reactive  Non-Reactive Final    A Non-Reactive test result does not preclude the possibility of exposure or infection with HIV-1 and/or HIV-2  Non-Reactive results can occur if the quantity of marker present is below the detection limits or is not present during the stage of disease in which a sample is collected  Repeat testing should be considered where there is clinical suspicion of infection        Annual Exam on 03/24/2023   Component Date Value Ref Range Status   • Case Report 03/24/2023    Final                    Value:Gynecologic Cytology Report                       Case: [id number]                                  Authorizing Provider:  Anastacio Lovett PA-C         Collected:           03/24/2023 8232              [facility]      Received:            03/24/2023 [facility]                                      [facility]                                                                               Romeo Moulton Screen:          Radha Sanchez Specimen:    LIQUID-BASED PAP, SCREENING, Cervix                                                       • Primary Interpretation 03/24/2023 Negative for intraepithelial lesion or malignancy   Final   • Specimen Adequacy 03/24/2023 Satisfactory for evaluation  Endocervical/transformation zone component present  Final   • Additional Information 03/24/2023    Final                    Value: This result contains rich text formatting which cannot be displayed here  • HPV Other HR 03/24/2023 Negative  Negative Final    HPV types: 93,79,68,18,74,41,98,76,90,65,83 and 68 DNA are undetectable or below the pre-set threshold     • HPV16 03/24/2023 Negative  Negative Final   • HPV18 03/24/2023 Negative  Negative Final

## 2023-06-22 NOTE — PATIENT INSTRUCTIONS
Wellness Visit for Adults   AMBULATORY CARE:   A wellness visit  is when you see your healthcare provider to get screened for health problems  Your healthcare provider will also give you advice on how to stay healthy  Write down your questions so you remember to ask them  Ask your healthcare provider how often you should have a wellness visit  What happens at a wellness visit:  Your healthcare provider will ask about your health, and your family history of health problems  This includes high blood pressure, heart disease, and cancer  He or she will ask if you have symptoms that concern you, if you smoke, and about your mood  You may also be asked about your intake of medicines, supplements, food, and alcohol  Any of the following may be done: Your weight  will be checked  Your height may also be checked so your body mass index (BMI) can be calculated  Your BMI shows if you are at a healthy weight  Your blood pressure  and heart rate will be checked  Your temperature may also be checked  Blood and urine tests  may be done  Blood tests may be done to check your cholesterol levels  Abnormal cholesterol levels increase your risk for heart disease and stroke  You may also need a blood or urine test to check for diabetes if you are at increased risk  Urine tests may be done to look for signs of an infection or kidney disease  A physical exam  includes checking your heartbeat and lungs with a stethoscope  Your healthcare provider may also check your skin to look for sun damage  Screening tests  may be recommended  A screening test is done to check for diseases that may not cause symptoms  The screening tests you may need depend on your age, gender, family history, and lifestyle habits  For example, colorectal screening may be recommended if you are 48years old or older  Screening tests you need if you are a woman:   A Pap smear  is used to screen for cervical cancer   Pap smears are usually done every 3 to 5 years depending on your age  You may need them more often if you have had abnormal Pap smear test results in the past  Ask your healthcare provider how often you should have a Pap smear  A mammogram  is an x-ray of your breasts to screen for breast cancer  Experts recommend mammograms every 2 years starting at age 48 years  You may need a mammogram at age 52 years or younger if you have an increased risk for breast cancer  Talk to your healthcare provider about when you should start having mammograms and how often you need them  Vaccines you may need:   Get an influenza vaccine  every year  The influenza vaccine protects you from the flu  Several types of viruses cause the flu  The viruses change over time, so new vaccines are made each year  Get a tetanus-diphtheria (Td) booster vaccine  every 10 years  This vaccine protects you against tetanus and diphtheria  Tetanus is a severe infection that may cause painful muscle spasms and lockjaw  Diphtheria is a severe bacterial infection that causes a thick covering in the back of your mouth and throat  Get a human papillomavirus (HPV) vaccine  if you are female and aged 23 to 32 or male 23 to 24 and never received it  This vaccine protects you from HPV infection  HPV is the most common infection spread by sexual contact  HPV may also cause vaginal, penile, and anal cancers  Get a pneumococcal vaccine  if you are aged 72 years or older  The pneumococcal vaccine is an injection given to protect you from pneumococcal disease  Pneumococcal disease is an infection caused by pneumococcal bacteria  The infection may cause pneumonia, meningitis, or an ear infection  Get a shingles vaccine  if you are 60 or older, even if you have had shingles before  The shingles vaccine is an injection to protect you from the varicella-zoster virus  This is the same virus that causes chickenpox   Shingles is a painful rash that develops in people who had chickenpox or have been exposed to the virus  How to eat healthy:  My Plate is a model for planning healthy meals  It shows the types and amounts of foods that should go on your plate  Fruits and vegetables make up about half of your plate, and grains and protein make up the other half  A serving of dairy is included on the side of your plate  The amount of calories and serving sizes you need depends on your age, gender, weight, and height  Examples of healthy foods are listed below:  Eat a variety of vegetables  such as dark green, red, and orange vegetables  You can also include canned vegetables low in sodium (salt) and frozen vegetables without added butter or sauces  Eat a variety of fresh fruits , canned fruit in 100% juice, frozen fruit, and dried fruit  Include whole grains  At least half of the grains you eat should be whole grains  Examples include whole-wheat bread, wheat pasta, brown rice, and whole-grain cereals such as oatmeal     Eat a variety of protein foods such as seafood (fish and shellfish), lean meat, and poultry without skin (turkey and chicken)  Examples of lean meats include pork leg, shoulder, or tenderloin, and beef round, sirloin, tenderloin, and extra lean ground beef  Other protein foods include eggs and egg substitutes, beans, peas, soy products, nuts, and seeds  Choose low-fat dairy products such as skim or 1% milk or low-fat yogurt, cheese, and cottage cheese  Limit unhealthy fats  such as butter, hard margarine, and shortening  Exercise:  Exercise at least 30 minutes per day on most days of the week  Some examples of exercise include walking, biking, dancing, and swimming  You can also fit in more physical activity by taking the stairs instead of the elevator or parking farther away from stores  Include muscle strengthening activities 2 days each week  Regular exercise provides many health benefits   It helps you manage your weight, and decreases your risk for type 2 diabetes, heart disease, stroke, and high blood pressure  Exercise can also help improve your mood  Ask your healthcare provider about the best exercise plan for you  General health and safety guidelines:   Do not smoke  Nicotine and other chemicals in cigarettes and cigars can cause lung damage  Ask your healthcare provider for information if you currently smoke and need help to quit  E-cigarettes or smokeless tobacco still contain nicotine  Talk to your healthcare provider before you use these products  Limit alcohol  A drink of alcohol is 12 ounces of beer, 5 ounces of wine, or 1½ ounces of liquor  Lose weight, if needed  Being overweight increases your risk of certain health conditions  These include heart disease, high blood pressure, type 2 diabetes, and certain types of cancer  Protect your skin  Do not sunbathe or use tanning beds  Use sunscreen with a SPF 15 or higher  Apply sunscreen at least 15 minutes before you go outside  Reapply sunscreen every 2 hours  Wear protective clothing, hats, and sunglasses when you are outside  Drive safely  Always wear your seatbelt  Make sure everyone in your car wears a seatbelt  A seatbelt can save your life if you are in an accident  Do not use your cell phone when you are driving  This could distract you and cause an accident  Pull over if you need to make a call or send a text message  Practice safe sex  Use latex condoms if are sexually active and have more than one partner  Your healthcare provider may recommend screening tests for sexually transmitted infections (STIs)  Wear helmets, lifejackets, and protective gear  Always wear a helmet when you ride a bike or motorcycle, go skiing, or play sports that could cause a head injury  Wear protective equipment when you play sports  Wear a lifejacket when you are on a boat or doing water sports      © Copyright [facility] [employer] 2022 Information is for End User's use only and may not be sold, redistributed or otherwise used for commercial purposes  The above information is an  only  It is not intended as medical advice for individual conditions or treatments  Talk to your doctor, nurse or pharmacist before following any medical regimen to see if it is safe and effective for you

## 2023-09-20 ENCOUNTER — TELEPHONE (OUTPATIENT)
Dept: OBGYN CLINIC | Facility: CLINIC | Age: 53
End: 2023-09-20

## 2023-12-14 ENCOUNTER — TELEPHONE (OUTPATIENT)
Dept: OBGYN CLINIC | Facility: CLINIC | Age: 53
End: 2023-12-14

## 2024-01-09 ENCOUNTER — HOSPITAL ENCOUNTER (OUTPATIENT)
Dept: RADIOLOGY | Facility: HOSPITAL | Age: 54
Discharge: HOME | End: 2024-01-09
Payer: COMMERCIAL

## 2024-01-09 DIAGNOSIS — R92.30 DENSE BREAST TISSUE ON MAMMOGRAM: ICD-10-CM

## 2024-01-09 PROCEDURE — A9585 GADOBUTROL INJECTION: HCPCS | Performed by: PHYSICIAN ASSISTANT

## 2024-01-09 PROCEDURE — G1004 CDSM NDSC: HCPCS

## 2024-01-09 PROCEDURE — C8937 CAD BREAST MRI: HCPCS

## 2024-01-09 PROCEDURE — 77049 MRI BREAST C-+ W/CAD BI: CPT

## 2024-01-09 PROCEDURE — C8908 MRI W/O FOL W/CONT, BREAST,: HCPCS

## 2024-01-09 RX ORDER — GADOBUTROL 604.72 MG/ML
7 INJECTION INTRAVENOUS
Status: COMPLETED | OUTPATIENT
Start: 2024-01-09 | End: 2024-01-09

## 2024-01-09 RX ADMIN — GADOBUTROL 7 ML: 604.72 INJECTION INTRAVENOUS at 17:58

## 2024-03-26 ENCOUNTER — ANNUAL EXAM (OUTPATIENT)
Dept: OBGYN CLINIC | Facility: CLINIC | Age: 54
End: 2024-03-26
Payer: COMMERCIAL

## 2024-03-26 VITALS
HEIGHT: 60 IN | BODY MASS INDEX: 37.69 KG/M2 | DIASTOLIC BLOOD PRESSURE: 72 MMHG | WEIGHT: 192 LBS | SYSTOLIC BLOOD PRESSURE: 120 MMHG

## 2024-03-26 DIAGNOSIS — B37.2 CANDIDA INFECTION OF FLEXURAL SKIN: ICD-10-CM

## 2024-03-26 DIAGNOSIS — Z01.419 WELL WOMAN EXAM WITH ROUTINE GYNECOLOGICAL EXAM: Primary | ICD-10-CM

## 2024-03-26 PROCEDURE — S0612 ANNUAL GYNECOLOGICAL EXAMINA: HCPCS | Performed by: PHYSICIAN ASSISTANT

## 2024-03-26 RX ORDER — NYSTATIN 100000 U/G
OINTMENT TOPICAL 2 TIMES DAILY
Qty: 30 G | Refills: 1 | Status: SHIPPED | OUTPATIENT
Start: 2024-03-26

## 2024-03-26 NOTE — PROGRESS NOTES
Assessment   53 y.o.  presenting for annual exam.     Plan   Diagnoses and all orders for this visit:    Well woman exam with routine gynecological exam    Candida infection of flexural skin  -     nystatin (MYCOSTATIN) ointment; Apply topically 2 (two) times a day Apply two times daily as needed        Pap up to date  Mammo scheduled. Has received breast MRIs over the past few years due to elevated lifetime risk. Lifetime risk on last MRI and mammogram 14% rather than 20%. Advised to complete mammogram this year. If <20% this year may be a better candidate for ABUS   Colonoscopy up to date   Flexural candidiasis- rx sent to pharmacy for nystatin ointment to be used on a PRN basis.    Perineal hygiene reviewed. Weight bearing exercises minium of 150 mins/weekly advised. Kegel exercises recommended.   SBE encouraged, A yearly mammogram is recommended for breast cancer screening starting at age 40. ASCCP guidelines reviewed. Calcium/ Vit D dietary requirements discussed.   Advised to call with any issues, all concerns & questions addressed.   See provided information in your after visit summary     F/U Annually and PRN    Results will be released to [facility], if abnormal will call or message to review and discuss treatment plan.     __________________________________________________________________    Subjective     Kiara Solo is a 53 y.o.  presenting for annual exam. She is without complaint     She uses nystatin powder in groin and under breasts b/l on a PRN basis for flexural candidiasis. She states she would prefer a cream over the the powder.    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 She helps with care during the day. Her  stay with MIL at night. They have an aid to help with the weekends    SCREENING  Last Pap: 3/24/2023 neg/neg  Last Mammo: 2023 birads 1; breast MRI 2024 birad 2  Last Colonoscopy: 2021 5 year recall      GYN  , no VB     Sexually active: Yes  - single partner - male    Hx Abnormal pap: denies  We reviewed ASCCP guidelines for Pap testing today.    Denies vaginal discharge, itching, odor, dyspareunia, pelvic pain and vulvar/vaginal symptoms      OB           Complaints: denies   Denies urgency, frequency, hematuria, leakage / change in stream, difficulty urinating.       BREAST  Complaints: denies   Denies: breast lump, breast tenderness, nipple discharge, skin color change, and skin lesion(s)  Personal hx: left breast lumpectomy       Pertinent Family Hx:   Family hx of breast cancer: mom (65), paternal aunt (72)  Family hx of ovarian cancer: no  Family hx of colon cancer: dad (68)      GENERAL  PMH reviewed/updated and is as below.   Patient does follow with a PCP.    SOCIAL  Smoking: no   Alcohol:social  Drug: no  Occupation: works at [employer]      Past Medical History:   Diagnosis Date    Abnormal mammogram     Benign tumor of breast     left breast    Dysfunctional uterine bleeding     Resolved: 2017    Skin lesion of scalp     last assessed: 2017       Past Surgical History:   Procedure Laterality Date    APPENDECTOMY      BREAST BIOPSY Left     BREAST LUMPECTOMY      BREAST SURGERY Left 2012    bx    MAMMO STEREOTACTIC BREAST BIOPSY LEFT (ALL INC) Left 2012    AK COLONOSCOPY FLX DX W/COLLJ SPEC WHEN PFRMD N/A 2016    Procedure: COLONOSCOPY;  Surgeon: Krish Ramsey MD;  Location: [facility];  Service: Gastroenterology         Current Outpatient Medications:     Multiple Vitamin (MULTIVITAMIN) tablet, Take 2 tablets by mouth daily.  , Disp: , Rfl:     nystatin (MYCOSTATIN) ointment, Apply topically 2 (two) times a day Apply two times daily as needed, Disp: 30 g, Rfl: 1    No Known Allergies    Social History     Socioeconomic History    Marital status: /Civil Union     Spouse name: Not on file    Number of children: Not on file    Years of education: Not on file    Highest education level: Not  "on file   Occupational History    Not on file   Tobacco Use    Smoking status: Never    Smokeless tobacco: Never    Tobacco comments:     Never Smoked   Vaping Use    Vaping status: Never Used   Substance and Sexual Activity    Alcohol use: Yes     Alcohol/week: 2.0 standard drinks of alcohol     Types: 2 Standard drinks or equivalent per week     Comment: Drink Occassionaly    Drug use: No    Sexual activity: Yes     Partners: Male     Birth control/protection: Abstinence, None   Other Topics Concern    Not on file   Social History Narrative    Caffeine use    Exercising regularly     Social Determinants of Health     Financial Resource Strain: Not on file   Food Insecurity: Not on file   Transportation Needs: Not on file   Physical Activity: Not on file   Stress: Not on file   Social Connections: Not on file   Intimate Partner Violence: Not on file   Housing Stability: Not on file       Review of Systems     ROS:  Constitutional: Negative for fatigue and unexpected weight change.   Respiratory: Negative for cough and shortness of breath.    Cardiovascular: Negative for chest pain and palpitations.   Gastrointestinal: Negative for abdominal pain and change in bowel habits  Breasts:  Negative, other than as noted above.   Genitourinary: Negative, other than as noted above.   Psychiatric: Negative for mood difficulties.      Objective     Vitals:    03/26/24 0659   BP: 120/72        /72 (BP Location: Left arm, Patient Position: Sitting, Cuff Size: Large)   Ht 5' 0.25\" (1.53 m)   Wt 87.1 kg (192 lb)   LMP 01/02/2018   BMI 37.19 kg/m²     Physical Examination:    Patient appears well and is not in distress  Neck is supple without masses, no cervical or supraclavicular lymphadenopathy  Cardiovascular: regular rate and rhythm; no murmurs  Lungs: clear to auscultation bilaterally; no wheezes  Breasts are symmetrical without mass, tenderness, nipple discharge, skin changes or adenopathy.   Abdomen is soft and " nontender without masses.   External genitals are normal without lesions or rashes.  Urethral meatus and urethra are normal  Bladder is normal to palpation  Vagina is normal without discharge or bleeding.   Cervix is normal without discharge or lesion.   Uterus is normal, mobile, nontender without palpable mass.  Adnexa are normal, nontender, without palpable mass.

## 2024-03-26 NOTE — PROGRESS NOTES
Patient presents for a routine annual visit  PMB- none   Last Pap Smear- 2023 -/-  Mammogram- 2023 - MRI 2024 birads 2 - every 6 months   Has referral   Colonoscopy- 2021  5 yr recall     nonsmoker  Currently sexually active - one partner   No family history of uterine, ovarian, cervical   Mom and PA breast cancer  Pt would like to know if she can get nystatin cream instead of powder   No concerns/questions for today's visit

## 2024-04-26 NOTE — PROGRESS NOTES
Assessment/Plan:    Postmenopausal bleeding  Light bleeding x3d occurred in 1/2020  In 11/2019 she had EMB for  bleeding with benign results  Exam today is unremarkable  Recommended repeating pelvic US and will advise further with results  Discussed D&C versus hysterectomy - will refer to a surgeon colleague as indicated  Encounter for gynecological examination (general) (routine) without abnormal findings  Benign findings on routine gyn exam  Recommended monthly SBE, annual CBE and annual screening mammo  ASCCP guidelines reviewed and pap with cotesting was collected today  Colonoscopy noted to be up to date; repeat q5 yrs per pt  The patient denies STI risk factors and declines testing at this time  Reviewed diet/activity recommendations:  Encouraged daily Ca++ and vitamin D intake as well as daily weight bearing exercise for promotion of bone health    Discussed postmenopausal considerations and symptoms to report  RTO in one year for routine annual gyn exam or sooner PRN  Diagnoses and all orders for this visit:    Encounter for gynecological examination (general) (routine) without abnormal findings    Encounter for screening mammogram for malignant neoplasm of breast  -     Mammo screening bilateral w cad; Future    Encounter for Papanicolaou smear for cervical cancer screening  -     Liquid-based pap, screening    Screening for HPV (human papillomavirus)  -     Liquid-based pap, screening    Postmenopausal bleeding  -     US pelvis complete w transvaginal; Future          Subjective:      Patient ID: Shaila Herr is a 51 y o  female  This patient presents for routine annual gyn exam   Medically stable  Postmenopausal bleeding was reported in 10/2019 and EMB was benign; US showed multiple stable fibroids and ES 9mm  Since then she did have another episode of this - 1/2020, light bleeding x3d, no assoc pelvic pain  She denies acute gyn complaints   She denies VM sx, pelvic pain, breast Patient, Benjamin, called to request a referral to Neurology to address continued sciatic pain and difficulty walking.    Patient states she has placed several calls to the office and sent multiple patient messages to the provider without any response.  Reviewed patient's chart encounter noting there has been no recent activity with the exception of a medication refill.  Last noted patient interaction was at office visit of 4/18/24.    Patient states she is unable to walk, sit, or use the bathroom without pain.  Pain level is described as 8+, but not constant.    Offered appointment.  Patient refused.  Reviewed PT referral of January and April of this year.  Patient refuses to go to physical therapy.    Patient's only request is for referral to Neurology but NOT Dr. Soriano, as well as updating the provider with her current situation.     Please advise patient when Neurology Referral is placed,    concerns, abn discharge, bowel/bladder dysfunction, depression/anx   and monog  Denies STI concerns  The following portions of the patient's history were reviewed and updated as appropriate: allergies, current medications, past family history, past medical history, past social history, past surgical history and problem list     Review of Systems   Constitutional: Negative  Respiratory: Negative  Cardiovascular: Negative  Gastrointestinal: Negative  Genitourinary: Negative  Musculoskeletal: Negative  Skin: Negative  Neurological: Negative  Psychiatric/Behavioral: Negative  Objective:      /72   Wt 87 5 kg (193 lb)   LMP 01/02/2018   BMI 36 67 kg/m²          Physical Exam   Constitutional: She is oriented to person, place, and time  She appears well-developed and well-nourished  BMI 36   HENT:   Head: Normocephalic and atraumatic  Eyes: Pupils are equal, round, and reactive to light  EOM are normal    Neck: Normal range of motion  Neck supple  No thyromegaly present  Cardiovascular: Normal rate, regular rhythm and normal heart sounds  Pulmonary/Chest: Effort normal and breath sounds normal  No respiratory distress  She has no wheezes  She has no rales  She exhibits no mass, no tenderness and no deformity  Right breast exhibits no inverted nipple, no mass, no nipple discharge, no skin change and no tenderness  Left breast exhibits no inverted nipple, no mass, no nipple discharge, no skin change and no tenderness  No breast tenderness or discharge  Breasts are symmetrical    Abdominal: Soft  She exhibits no distension and no mass  There is no splenomegaly or hepatomegaly  There is no tenderness  There is no rebound and no guarding  Genitourinary: Rectum normal, vagina normal and uterus normal        No breast tenderness or discharge  No labial fusion  There is no rash, tenderness, lesion or injury on the right labia   There is no rash, tenderness, lesion or injury on the left labia  Cervix exhibits no motion tenderness, no discharge and no friability  Right adnexum displays no mass, no tenderness and no fullness  Left adnexum displays no mass, no tenderness and no fullness  No erythema, tenderness or bleeding in the vagina  No foreign body in the vagina  No vaginal discharge found  Musculoskeletal: Normal range of motion  Lymphadenopathy:     She has no cervical adenopathy  She has no axillary adenopathy  Neurological: She is alert and oriented to person, place, and time  No cranial nerve deficit  Skin: Skin is warm and dry  No rash noted  No cyanosis  Nails show no clubbing  Psychiatric: She has a normal mood and affect   Her speech is normal and behavior is normal  Judgment and thought content normal  Cognition and memory are normal

## 2024-05-06 NOTE — PATIENT INSTRUCTIONS
This 45-year-old patient was told that she may be menopausal   I have sent her for pelvic ultrasound and serum FSH and estradiol levels  We will discuss the results when they come back  Vaccine status unknown

## 2024-05-28 ENCOUNTER — HOSPITAL ENCOUNTER (OUTPATIENT)
Dept: RADIOLOGY | Age: 54
Discharge: HOME | End: 2024-05-28
Payer: COMMERCIAL

## 2024-05-28 VITALS — BODY MASS INDEX: 32.2 KG/M2 | WEIGHT: 164 LBS | HEIGHT: 60 IN

## 2024-05-28 DIAGNOSIS — Z12.31 VISIT FOR SCREENING MAMMOGRAM: ICD-10-CM

## 2024-05-28 PROCEDURE — 77067 SCR MAMMO BI INCL CAD: CPT

## 2024-05-28 PROCEDURE — 77063 BREAST TOMOSYNTHESIS BI: CPT

## 2024-06-24 ENCOUNTER — RA CDI HCC (OUTPATIENT)
Dept: OTHER | Facility: HOSPITAL | Age: 54
End: 2024-06-24

## 2024-06-25 ENCOUNTER — TELEPHONE (OUTPATIENT)
Age: 54
End: 2024-06-25

## 2024-06-25 DIAGNOSIS — Z00.00 ANNUAL PHYSICAL EXAM: Primary | ICD-10-CM

## 2024-06-25 NOTE — TELEPHONE ENCOUNTER
PT call about her appointment for next Monday for a physical. PT would like to know if she need to do lab work before her appointment because the PT has not order for lab work. Please follow up with the PT. Thank you

## 2024-06-26 NOTE — PROGRESS NOTES
CBC, CMP, TSH and lipid panel ordered ahead of patient's upcoming annual physical exam as requested.

## 2024-06-27 NOTE — TELEPHONE ENCOUNTER
Pt returning call from office; pt made aware and helped navigate on [facility] to see it herself. Pt will get bloodwork done tomorrow.

## 2024-06-28 ENCOUNTER — APPOINTMENT (OUTPATIENT)
Dept: LAB | Age: 54
End: 2024-06-28
Payer: COMMERCIAL

## 2024-06-28 DIAGNOSIS — Z00.00 ANNUAL PHYSICAL EXAM: ICD-10-CM

## 2024-06-28 LAB
ALBUMIN SERPL BCG-MCNC: 4 G/DL (ref 3.5–5)
ALP SERPL-CCNC: 77 U/L (ref 34–104)
ALT SERPL W P-5'-P-CCNC: 29 U/L (ref 7–52)
ANION GAP SERPL CALCULATED.3IONS-SCNC: 7 MMOL/L (ref 4–13)
AST SERPL W P-5'-P-CCNC: 19 U/L (ref 13–39)
BASOPHILS # BLD AUTO: 0.05 THOUSANDS/ÂΜL (ref 0–0.1)
BASOPHILS NFR BLD AUTO: 1 % (ref 0–1)
BILIRUB SERPL-MCNC: 0.38 MG/DL (ref 0.2–1)
BUN SERPL-MCNC: 21 MG/DL (ref 5–25)
CALCIUM SERPL-MCNC: 9 MG/DL (ref 8.4–10.2)
CHLORIDE SERPL-SCNC: 108 MMOL/L (ref 96–108)
CHOLEST SERPL-MCNC: 176 MG/DL
CO2 SERPL-SCNC: 24 MMOL/L (ref 21–32)
CREAT SERPL-MCNC: 0.67 MG/DL (ref 0.6–1.3)
EOSINOPHIL # BLD AUTO: 0.13 THOUSAND/ÂΜL (ref 0–0.61)
EOSINOPHIL NFR BLD AUTO: 2 % (ref 0–6)
ERYTHROCYTE [DISTWIDTH] IN BLOOD BY AUTOMATED COUNT: 12.1 % (ref 11.6–15.1)
GFR SERPL CREATININE-BSD FRML MDRD: 101 ML/MIN/1.73SQ M
GLUCOSE P FAST SERPL-MCNC: 100 MG/DL (ref 65–99)
HCT VFR BLD AUTO: 40.9 % (ref 34.8–46.1)
HDLC SERPL-MCNC: 44 MG/DL
HGB BLD-MCNC: 13.5 G/DL (ref 11.5–15.4)
IMM GRANULOCYTES # BLD AUTO: 0.03 THOUSAND/UL (ref 0–0.2)
IMM GRANULOCYTES NFR BLD AUTO: 0 % (ref 0–2)
LDLC SERPL CALC-MCNC: 110 MG/DL (ref 0–100)
LYMPHOCYTES # BLD AUTO: 2.27 THOUSANDS/ÂΜL (ref 0.6–4.47)
LYMPHOCYTES NFR BLD AUTO: 33 % (ref 14–44)
MCH RBC QN AUTO: 28.9 PG (ref 26.8–34.3)
MCHC RBC AUTO-ENTMCNC: 33 G/DL (ref 31.4–37.4)
MCV RBC AUTO: 88 FL (ref 82–98)
MONOCYTES # BLD AUTO: 0.44 THOUSAND/ÂΜL (ref 0.17–1.22)
MONOCYTES NFR BLD AUTO: 6 % (ref 4–12)
NEUTROPHILS # BLD AUTO: 4.04 THOUSANDS/ÂΜL (ref 1.85–7.62)
NEUTS SEG NFR BLD AUTO: 58 % (ref 43–75)
NONHDLC SERPL-MCNC: 132 MG/DL
NRBC BLD AUTO-RTO: 0 /100 WBCS
PLATELET # BLD AUTO: 243 THOUSANDS/UL (ref 149–390)
PMV BLD AUTO: 10.6 FL (ref 8.9–12.7)
POTASSIUM SERPL-SCNC: 4.1 MMOL/L (ref 3.5–5.3)
PROT SERPL-MCNC: 6.6 G/DL (ref 6.4–8.4)
RBC # BLD AUTO: 4.67 MILLION/UL (ref 3.81–5.12)
SODIUM SERPL-SCNC: 139 MMOL/L (ref 135–147)
TRIGL SERPL-MCNC: 110 MG/DL
TSH SERPL DL<=0.05 MIU/L-ACNC: 1.4 UIU/ML (ref 0.45–4.5)
WBC # BLD AUTO: 6.96 THOUSAND/UL (ref 4.31–10.16)

## 2024-06-28 PROCEDURE — 84443 ASSAY THYROID STIM HORMONE: CPT

## 2024-06-28 PROCEDURE — 80061 LIPID PANEL: CPT

## 2024-06-28 PROCEDURE — 85025 COMPLETE CBC W/AUTO DIFF WBC: CPT

## 2024-06-28 PROCEDURE — 36415 COLL VENOUS BLD VENIPUNCTURE: CPT

## 2024-06-28 PROCEDURE — 80053 COMPREHEN METABOLIC PANEL: CPT

## 2024-07-31 ENCOUNTER — OFFICE VISIT (OUTPATIENT)
Dept: INTERNAL MEDICINE CLINIC | Age: 54
End: 2024-07-31
Payer: COMMERCIAL

## 2024-07-31 VITALS
SYSTOLIC BLOOD PRESSURE: 122 MMHG | DIASTOLIC BLOOD PRESSURE: 70 MMHG | HEIGHT: 60 IN | TEMPERATURE: 98.4 F | HEART RATE: 64 BPM | OXYGEN SATURATION: 96 % | BODY MASS INDEX: 37.38 KG/M2 | WEIGHT: 190.4 LBS

## 2024-07-31 DIAGNOSIS — Z00.00 ANNUAL PHYSICAL EXAM: Primary | ICD-10-CM

## 2024-07-31 PROCEDURE — 99396 PREV VISIT EST AGE 40-64: CPT | Performed by: INTERNAL MEDICINE

## 2024-07-31 NOTE — PROGRESS NOTES
Assessment/Plan:     Annual physical examination  - History and physical examination done  - Pt was counseled to eat a heart healthy diet, to drink at least 2 L of water daily, to take a daily multivitamin and to exercise for at least 30 minutes of cardio exercise daily, for at least 5 days a week.  - CBC, CMP, TSH and lipid panel were done on 6/28/2024 and all results were reviewed with patient and all questions answered.  -She is up-to-date with her COVID-vaccine x 2 and Tdap  -She is up-to-date with her colonoscopy, Pap smear and mammogram  - follow up in 1 year for an annual physical exam or sooner as needed    BMI 36.88  -We discussed weight loss and patient admits to a widespread family history of cancer.  I explained to her that if she wants Wegovy, she would have to ensure that there is no family history of medullary thyroid carcinoma or multiple endocrine neoplasia type II before I write the prescription for her.  - pt was counseled on diet and exercise as well as not eating late prior to bedtime. She needs 2200 sheri and was counseled to cut down to 1700 to 1800 sheri daily and to count her calories strictly.         Diagnoses and all orders for this visit:    Annual physical exam    BMI 36.0-36.9,adult            Depression Screening and Follow-up Plan: Patient was screened for depression during today's encounter. They screened negative with a PHQ-2 score of 0.      Subjective:      Patient ID: Kiara Solo is a 53 y.o. female.    HPI    Patient presents for an annual physical exam.    Last annual physical exam-6/22/23    Past medical history-Post menopausal bleeding,  Pedal edema, obesity,hld    Past surgical history-appendectomy, wisdom teeth extraction     Medications-see list    Allergies-NKDA    Diet-  mixture of balanced and junk food and drinking about 1.5L of water daily     Exercise- walking a lot daily    Alcohol use- 3 glasses a week    Caffeine and soda use- iced tea and soda    Nicotine  use-NEVER    Recreational drug use-none    Work- for      Sexual history, STD history and HIV testing--monogamous with , STD hx -  Never, hiv testing - never     Gynecological history/Prostate health/testicular health history-at age 44-45, last pap smear - 3/24/23, last mammogram - 5/28/24    Colonoscopy-9/1/22 and normal and told to return in 5 years 2/2 family hx of colon ca     Immunization history-up to date with 2 COVID vaccines, tdap - 6/16/22     Dental visit-every 6 months    Vision-myopia - glasses     Family history-  colon ca - dad(diagnosed in his 60s),   DM - dad,   breast cancer and liver - mom  Liver and lung ca- brother( smoker)     Today, patient admits to diaphoresis, occasional insomnia and occasional blood in her stool but denies any fever, chills,   headache, dizziness, nasal congestion, runny nose, pnd, sore throat, ear ache, sinus pain or pressure, wheezing, cough, chest pain, sob, palpitations, nausea, vomiting, diarrhea, constipation, abdominal pain, hematuria, melena stools, arthralgias, myalgias, feelings of anxiety, depression..      The following portions of the patient's history were reviewed and updated as appropriate: She  has a past medical history of Abnormal mammogram, Benign tumor of breast, Dysfunctional uterine bleeding, and Skin lesion of scalp.  She   Patient Active Problem List    Diagnosis Date Noted   • Fungal dermatitis 06/22/2023   • Other hyperlipidemia 06/22/2023   • Family history of breast cancer in mother 01/27/2022   • Lesion of skin of breast 01/27/2022   • BMI 36.0-36.9,adult 06/16/2021   • Family history of colon cancer in father 06/16/2021   • Annual physical exam 03/16/2020   • Rash 10/11/2018   • Bilateral leg edema 08/27/2018   • Dyspnea on exertion 08/27/2018   • Skin lesion of scalp 11/09/2017     She  has a past surgical history that includes Appendectomy; pr colonoscopy flx dx w/collj spec when pfrmd (N/A, 07/20/2016); Mammo  stereotactic breast biopsy left (all inc) (Left, 09/25/2012); Breast surgery (Left, 09/2012); Breast biopsy (Left); and Breast lumpectomy.  Her family history includes Bone cancer (age of onset: 62) in her father; Breast cancer (age of onset: 65) in her mother; Breast cancer (age of onset: 72) in her paternal aunt; Cancer in her brother, father, and mother; Colon cancer (age of onset: 68) in her father; Diabetes in her father; Kidney cancer (age of onset: 55) in her brother; Liver cancer (age of onset: 62) in her father; Liver cancer (age of onset: 64) in her mother; Lung cancer (age of onset: 15) in her brother; Lung cancer (age of onset: 64) in her mother; No Known Problems in her maternal aunt, maternal grandfather, maternal grandmother, paternal grandfather, and paternal grandmother.  She  reports that she has never smoked. She has never used smokeless tobacco. She reports current alcohol use of about 2.0 standard drinks of alcohol per week. She reports that she does not use drugs.  Current Outpatient Medications   Medication Sig Dispense Refill   • Multiple Vitamin (MULTIVITAMIN) tablet Take 2 tablets by mouth daily.       • nystatin (MYCOSTATIN) ointment Apply topically 2 (two) times a day Apply two times daily as needed 30 g 1     No current facility-administered medications for this visit.     Current Outpatient Medications on File Prior to Visit   Medication Sig   • Multiple Vitamin (MULTIVITAMIN) tablet Take 2 tablets by mouth daily.     • nystatin (MYCOSTATIN) ointment Apply topically 2 (two) times a day Apply two times daily as needed     No current facility-administered medications on file prior to visit.     She has No Known Allergies..    Review of Systems   Constitutional:  Positive for diaphoresis. Negative for activity change, chills, fatigue, fever and unexpected weight change.   HENT:  Negative for ear pain, postnasal drip, rhinorrhea, sinus pressure and sore throat.    Eyes:  Negative for pain.  "  Respiratory:  Negative for cough, choking, chest tightness, shortness of breath and wheezing.    Cardiovascular:  Negative for chest pain, palpitations and leg swelling.   Gastrointestinal:  Positive for blood in stool (occasional blood in stool - last time was last week). Negative for abdominal pain, constipation, diarrhea, nausea and vomiting.   Genitourinary:  Negative for dysuria and hematuria.   Musculoskeletal:  Negative for arthralgias, back pain, gait problem, joint swelling, myalgias and neck stiffness.   Skin:  Negative for pallor and rash.   Neurological:  Negative for dizziness, tremors, seizures, syncope, light-headedness and headaches.   Hematological:  Negative for adenopathy.   Psychiatric/Behavioral:  Positive for sleep disturbance (occasionally). Negative for behavioral problems and dysphoric mood. The patient is not nervous/anxious.          Objective:      /70 (BP Location: Left arm, Patient Position: Sitting, Cuff Size: Large)   Pulse 64   Temp 98.4 °F (36.9 °C) (Temporal)   Ht 5' 0.25\" (1.53 m)   Wt 86.4 kg (190 lb 6.4 oz)   LMP 01/02/2018   SpO2 96%   BMI 36.88 kg/m²          Physical Exam  Constitutional:       General: She is not in acute distress.     Appearance: She is well-developed. She is not diaphoretic.   HENT:      Head: Normocephalic and atraumatic.      Right Ear: External ear normal.      Left Ear: External ear normal.      Nose: Nose normal.      Mouth/Throat:      Mouth: Mucous membranes are dry.      Pharynx: No oropharyngeal exudate.      Comments: Dry mucous memb with Mallampati class 4 oropharynx   Eyes:      General: No scleral icterus.        Right eye: No discharge.         Left eye: No discharge.      Conjunctiva/sclera: Conjunctivae normal.      Pupils: Pupils are equal, round, and reactive to light.   Neck:      Thyroid: No thyromegaly.      Vascular: No JVD.      Trachea: No tracheal deviation.   Cardiovascular:      Rate and Rhythm: Normal rate and " regular rhythm.      Heart sounds: Normal heart sounds. No murmur heard.     No friction rub. No gallop.   Pulmonary:      Effort: Pulmonary effort is normal. No respiratory distress.      Breath sounds: Normal breath sounds. No wheezing or rales.   Chest:      Chest wall: No tenderness.   Abdominal:      General: Bowel sounds are normal. There is no distension.      Palpations: Abdomen is soft. There is no mass.      Tenderness: There is no abdominal tenderness. There is no guarding or rebound.   Musculoskeletal:         General: No tenderness or deformity. Normal range of motion.      Cervical back: Normal range of motion and neck supple.   Lymphadenopathy:      Cervical: No cervical adenopathy.   Skin:     General: Skin is warm and dry.      Coloration: Skin is not pale.      Findings: No erythema or rash.   Neurological:      Mental Status: She is alert and oriented to person, place, and time.      Cranial Nerves: No cranial nerve deficit.      Motor: No abnormal muscle tone.      Coordination: Coordination normal.      Deep Tendon Reflexes: Reflexes are normal and symmetric.      Comments: Cranial nerves 2-12 are intact bilaterally  Muscle strength is 5/5 in all extremities  Sensation is intact in bilateral face and extremities  Rapid alternating movement and finger-to-nose pointing test intact   Deep tendon reflexes are 2+ bilaterally  Gait is intact         Psychiatric:         Behavior: Behavior normal.           Appointment on 06/28/2024   Component Date Value Ref Range Status   • WBC 06/28/2024 6.96  4.31 - 10.16 Thousand/uL Final   • RBC 06/28/2024 4.67  3.81 - 5.12 Million/uL Final   • Hemoglobin 06/28/2024 13.5  11.5 - 15.4 g/dL Final   • Hematocrit 06/28/2024 40.9  34.8 - 46.1 % Final   • MCV 06/28/2024 88  82 - 98 fL Final   • MCH 06/28/2024 28.9  26.8 - 34.3 pg Final   • MCHC 06/28/2024 33.0  31.4 - 37.4 g/dL Final   • RDW 06/28/2024 12.1  11.6 - 15.1 % Final   • MPV 06/28/2024 10.6  8.9 - 12.7 fL  Final   • Platelets 06/28/2024 243  149 - 390 Thousands/uL Final   • nRBC 06/28/2024 0  /100 WBCs Final   • Segmented % 06/28/2024 58  43 - 75 % Final   • Immature Grans % 06/28/2024 0  0 - 2 % Final   • Lymphocytes % 06/28/2024 33  14 - 44 % Final   • Monocytes % 06/28/2024 6  4 - 12 % Final   • Eosinophils Relative 06/28/2024 2  0 - 6 % Final   • Basophils Relative 06/28/2024 1  0 - 1 % Final   • Absolute Neutrophils 06/28/2024 4.04  1.85 - 7.62 Thousands/µL Final   • Absolute Immature Grans 06/28/2024 0.03  0.00 - 0.20 Thousand/uL Final   • Absolute Lymphocytes 06/28/2024 2.27  0.60 - 4.47 Thousands/µL Final   • Absolute Monocytes 06/28/2024 0.44  0.17 - 1.22 Thousand/µL Final   • Eosinophils Absolute 06/28/2024 0.13  0.00 - 0.61 Thousand/µL Final   • Basophils Absolute 06/28/2024 0.05  0.00 - 0.10 Thousands/µL Final   • TSH 3RD GENERATON 06/28/2024 1.399  0.450 - 4.500 uIU/mL Final    The recommended reference ranges for TSH during pregnancy are as follows:   First trimester 0.100 to 2.500 uIU/mL   Second trimester  0.200 to 3.000 uIU/mL   Third trimester 0.300 to 3.000 uIU/m    Note: Normal ranges may not apply to patients who are transgender, non-binary, or whose legal sex, sex at birth, and gender identity differ.  Adult TSH (3rd generation) reference range follows the recommended guidelines of the American Thyroid Association, January, 2020.   • Sodium 06/28/2024 139  135 - 147 mmol/L Final   • Potassium 06/28/2024 4.1  3.5 - 5.3 mmol/L Final   • Chloride 06/28/2024 108  96 - 108 mmol/L Final   • CO2 06/28/2024 24  21 - 32 mmol/L Final   • ANION GAP 06/28/2024 7  4 - 13 mmol/L Final   • BUN 06/28/2024 21  5 - 25 mg/dL Final   • Creatinine 06/28/2024 0.67  0.60 - 1.30 mg/dL Final    Standardized to IDMS reference method   • Glucose, Fasting 06/28/2024 100 (H)  65 - 99 mg/dL Final   • Calcium 06/28/2024 9.0  8.4 - 10.2 mg/dL Final   • AST 06/28/2024 19  13 - 39 U/L Final   • ALT 06/28/2024 29  7 - 52 U/L Final     Specimen collection should occur prior to Sulfasalazine administration due to the potential for falsely depressed results.    • Alkaline Phosphatase 06/28/2024 77  34 - 104 U/L Final   • Total Protein 06/28/2024 6.6  6.4 - 8.4 g/dL Final   • Albumin 06/28/2024 4.0  3.5 - 5.0 g/dL Final   • Total Bilirubin 06/28/2024 0.38  0.20 - 1.00 mg/dL Final    Use of this assay is not recommended for patients undergoing treatment with eltrombopag due to the potential for falsely elevated results.  N-acetyl-p-benzoquinone imine (metabolite of Acetaminophen) will generate erroneously low results in samples for patients that have taken an overdose of Acetaminophen.   • eGFR 06/28/2024 101  ml/min/1.73sq m Final   • Cholesterol 06/28/2024 176  See Comment mg/dL Final    Cholesterol:         Pediatric <18 Years        Desirable          <170 mg/dL      Borderline High    170-199 mg/dL      High               >=200 mg/dL        Adult >=18 Years            Desirable         <200 mg/dL      Borderline High   200-239 mg/dL      High              >239 mg/dL     • Triglycerides 06/28/2024 110  See Comment mg/dL Final    Triglyceride:     0-9Y            <75mg/dL     10Y-17Y         <90 mg/dL       >=18Y     Normal          <150 mg/dL     Borderline High 150-199 mg/dL     High            200-499 mg/dL        Very High       >499 mg/dL    Specimen collection should occur prior to Metamizole administration due to the potential for falsely depressed results.   • HDL, Direct 06/28/2024 44 (L)  >=50 mg/dL Final   • LDL Calculated 06/28/2024 110 (H)  0 - 100 mg/dL Final    LDL Cholesterol:     Optimal           <100 mg/dl     Near Optimal      100-129 mg/dl     Above Optimal       Borderline High 130-159 mg/dl       High            160-189 mg/dl       Very High       >189 mg/dl         This screening LDL is a calculated result.   It does not have the accuracy of the Direct Measured LDL in the monitoring of patients with hyperlipidemia and/or  statin therapy.   Direct Measure LDL (EHP628) must be ordered separately in these patients.   • Non-HDL-Chol (CHOL-HDL) 06/28/2024 132  mg/dl Final

## 2024-07-31 NOTE — PATIENT INSTRUCTIONS
"Patient Education     Routine physical for adults   The Basics   Written by the doctors and editors at [facility]   What is a physical? -- A physical is a routine visit, or \"check-up,\" with your doctor. You might also hear it called a \"wellness visit\" or \"preventive visit.\"  During each visit, the doctor will:   Ask about your physical and mental health   Ask about your habits, behaviors, and lifestyle   Do an exam   Give you vaccines if needed   Talk to you about any medicines you take   Give advice about your health   Answer your questions  Getting regular check-ups is an important part of taking care of your health. It can help your doctor find and treat any problems you have. But it's also important for preventing health problems.  A routine physical is different from a \"sick visit.\" A sick visit is when you see a doctor because of a health concern or problem. Since physicals are scheduled ahead of time, you can think about what you want to ask the doctor.  How often should I get a physical? -- It depends on your age and health. In general, for people age 21 years and older:   If you are younger than 50 years, you might be able to get a physical every 3 years.   If you are 50 years or older, your doctor might recommend a physical every year.  If you have an ongoing health condition, like diabetes or high blood pressure, your doctor will probably want to see you more often.  What happens during a physical? -- In general, each visit will include:   Physical exam - The doctor or nurse will check your height, weight, heart rate, and blood pressure. They will also look at your eyes and ears. They will ask about how you are feeling and whether you have any symptoms that bother you.   Medicines - It's a good idea to bring a list of all the medicines you take to each doctor visit. Your doctor will talk to you about your medicines and answer any questions. Tell them if you are having any side effects that bother you. You " "should also tell them if you are having trouble paying for any of your medicines.   Habits and behaviors - This includes:   Your diet   Your exercise habits   Whether you smoke, drink alcohol, or use drugs   Whether you are sexually active   Whether you feel safe at home  Your doctor will talk to you about things you can do to improve your health and lower your risk of health problems. They will also offer help and support. For example, if you want to quit smoking, they can give you advice and might prescribe medicines. If you want to improve your diet or get more physical activity, they can help you with this, too.   Lab tests, if needed - The tests you get will depend on your age and situation. For example, your doctor might want to check your:   Cholesterol   Blood sugar   Iron level   Vaccines - The recommended vaccines will depend on your age, health, and what vaccines you already had. Vaccines are very important because they can prevent certain serious or deadly infections.   Discussion of screening - \"Screening\" means checking for diseases or other health problems before they cause symptoms. Your doctor can recommend screening based on your age, risk, and preferences. This might include tests to check for:   Cancer, such as breast, prostate, cervical, ovarian, colorectal, prostate, lung, or skin cancer   Sexually transmitted infections, such as chlamydia and gonorrhea   Mental health conditions like depression and anxiety  Your doctor will talk to you about the different types of screening tests. They can help you decide which screenings to have. They can also explain what the results might mean.   Answering questions - The physical is a good time to ask the doctor or nurse questions about your health. If needed, they can refer you to other doctors or specialists, too.  Adults older than 65 years often need other care, too. As you get older, your doctor will talk to you about:   How to prevent falling at " home   Hearing or vision tests   Memory testing   How to take your medicines safely   Making sure that you have the help and support you need at home  All topics are updated as new evidence becomes available and our peer review process is complete.  This topic retrieved from [employer] on: May 02, 2024.  Topic 821390 Version 1.0  Release: 32.4.3 - C32.122  © 2024 UpToDate, Inc. and/or its affiliates. All rights reserved.  Consumer Information Use and Disclaimer   Disclaimer: This generalized information is a limited summary of diagnosis, treatment, and/or medication information. It is not meant to be comprehensive and should be used as a tool to help the user understand and/or assess potential diagnostic and treatment options. It does NOT include all information about conditions, treatments, medications, side effects, or risks that may apply to a specific patient. It is not intended to be medical advice or a substitute for the medical advice, diagnosis, or treatment of a health care provider based on the health care provider's examination and assessment of a patient's specific and unique circumstances. Patients must speak with a health care provider for complete information about their health, medical questions, and treatment options, including any risks or benefits regarding use of medications. This information does not endorse any treatments or medications as safe, effective, or approved for treating a specific patient. UpToDate, Inc. and its affiliates disclaim any warranty or liability relating to this information or the use thereof.The use of this information is governed by the Terms of Use, available at https://www.woltersAsyscouwer.com/en/know/clinical-effectiveness-terms. 2024© UpToDate, Inc. and its affiliates and/or licensors. All rights reserved.  Copyright   © 2024 Up[employer]Date, Inc. and/or its affiliates. All rights reserved.

## 2025-04-02 ENCOUNTER — ANNUAL EXAM (OUTPATIENT)
Dept: OBGYN CLINIC | Facility: CLINIC | Age: 55
End: 2025-04-02
Payer: COMMERCIAL

## 2025-04-02 VITALS
WEIGHT: 191.4 LBS | DIASTOLIC BLOOD PRESSURE: 72 MMHG | HEIGHT: 61 IN | BODY MASS INDEX: 36.14 KG/M2 | SYSTOLIC BLOOD PRESSURE: 112 MMHG

## 2025-04-02 DIAGNOSIS — Z01.419 WELL WOMAN EXAM WITH ROUTINE GYNECOLOGICAL EXAM: Primary | ICD-10-CM

## 2025-04-02 DIAGNOSIS — B37.2 CANDIDA INFECTION OF FLEXURAL SKIN: ICD-10-CM

## 2025-04-02 PROCEDURE — S0612 ANNUAL GYNECOLOGICAL EXAMINA: HCPCS | Performed by: PHYSICIAN ASSISTANT

## 2025-04-02 RX ORDER — NYSTATIN 100000 U/G
OINTMENT TOPICAL 2 TIMES DAILY
Qty: 30 G | Refills: 1 | Status: SHIPPED | OUTPATIENT
Start: 2025-04-02

## 2025-04-02 NOTE — PROGRESS NOTES
Name: Kiara Solo      : 1970      MRN: [id number]  Encounter Provider: Sarah Mccray PA-C  Encounter Date: 2025   Encounter department: [facility] [place]  :  Assessment & Plan  Well woman exam with routine gynecological exam  Pap up to date   Mammo scheduled   Colonoscopy up to date   Fhx of breast and colon cancer- discussed option of genetic testing through [facility] vs genetics oncology which pt will consider     Perineal hygiene reviewed. Weight bearing exercises minium of 150 mins/weekly advised. Kegel exercises recommended.   SBE encouraged, A yearly mammogram is recommended for breast cancer screening starting at age 40. ASCCP guidelines reviewed. Condoms encouraged with all sexual activity to prevent STI's. Gardisil vaccines recommended up to age 45. Calcium/ Vit D dietary requirements discussed.   Advised to call with any issues, all concerns & questions addressed.   See provided information in your after visit summary     F/U Annually and PRN    Results will be released to [employer], if abnormal will call or message to review and discuss treatment plan.        Candida infection of flexural skin    Orders:    nystatin (MYCOSTATIN) ointment; Apply topically 2 (two) times a day Apply two times daily as needed        History of Present Illness   HPI    Subjective     Kiara Solo is a 54 y.o.  presenting for annual exam. She is without complaint     Her MIL is still living with them. They do have caregivers coming to help with her care    She uses nystatin powder in groin and under breasts b/l on a PRN basis for flexural candidiasis. She states she would prefer a cream over the the powder.     SCREENING  Last Pap: 3/2023 neg/neg  Last Mammo: 2024 birads 1  Last Colonoscopy: 2021 5 year recall      GYN  , no VB     Sexually active: No    Hx Abnormal pap: denies  We reviewed ASCCP guidelines for Pap testing today.    Denies  vaginal discharge, itching, odor, dyspareunia, pelvic pain and vulvar/vaginal symptoms      OB           Complaints: denies   Denies urgency, frequency, hematuria, leakage / change in stream, difficulty urinating.       BREAST  Complaints: denies   Denies: breast lump, breast tenderness, nipple discharge, skin color change, and skin lesion(s)  Personal hx: left breast lumpectomy       Pertinent Family Hx:   Family hx of breast cancer: mom (65), paternal aunt (72)  Family hx of ovarian cancer: no  Family hx of colon cancer: dad (68)      GENERAL  PMH reviewed/updated and is as below.   Patient does follow with a PCP.    SOCIAL  Smoking: no  Alcohol:social  Drug: no  Occupation: works at [employer]       Past Medical History:   Diagnosis Date    Abnormal mammogram     Benign tumor of breast     left breast    Breast cancer (HCC) Mother    Colon cancer (HCC) Father    Dysfunctional uterine bleeding     Resolved: 2017    History of chemotherapy Mother and Father    Skin lesion of scalp     last assessed: 2017       Past Surgical History:   Procedure Laterality Date    APPENDECTOMY      BREAST BIOPSY Left 2017    BREAST LUMPECTOMY      BREAST SURGERY Left 2012    bx    COLPOSCOPY  2014    MAMMO STEREOTACTIC BREAST BIOPSY LEFT (ALL INC) Left 2012    IA COLONOSCOPY FLX DX W/COLLJ SPEC WHEN PFRMD N/A 2016    Procedure: COLONOSCOPY;  Surgeon: Krish Ramsey MD;  Location: [facility];  Service: Gastroenterology         Current Outpatient Medications:     Multiple Vitamin (MULTIVITAMIN) tablet, Take 2 tablets by mouth daily.  , Disp: , Rfl:     nystatin (MYCOSTATIN) ointment, Apply topically 2 (two) times a day Apply two times daily as needed, Disp: 30 g, Rfl: 1    No Known Allergies    Social History     Socioeconomic History    Marital status: /Civil Union     Spouse name: Not on file    Number of children: Not on file    Years of education: Not on file    Highest  "education level: Not on file   Occupational History    Not on file   Tobacco Use    Smoking status: Never    Smokeless tobacco: Never    Tobacco comments:     Never Smoked   Vaping Use    Vaping status: Never Used   Substance and Sexual Activity    Alcohol use: Yes     Alcohol/week: 2.0 standard drinks of alcohol     Types: 2 Standard drinks or equivalent per week     Comment: Drink Occassionaly    Drug use: No    Sexual activity: Not Currently     Partners: Male     Birth control/protection: Abstinence, None   Other Topics Concern    Not on file   Social History Narrative    Caffeine use    Exercising regularly     Social Drivers of Health     Financial Resource Strain: Not on file   Food Insecurity: Not on file   Transportation Needs: Not on file   Physical Activity: Not on file   Stress: Not on file   Social Connections: Not on file   Intimate Partner Violence: Not on file   Housing Stability: Not on file       Review of Systems   Constitutional: Negative.    Respiratory: Negative.     Cardiovascular: Negative.    Gastrointestinal: Negative.    Genitourinary: Negative.    Musculoskeletal: Negative.    Skin: Negative.    Psychiatric/Behavioral: Negative.            Objective   /72 (BP Location: Left arm, Patient Position: Sitting, Cuff Size: Standard)   Ht 5' 0.63\" (1.54 m)   Wt 86.8 kg (191 lb 6.4 oz)   LMP 01/02/2018   BMI 36.61 kg/m²      Physical Exam  Constitutional:       Appearance: Normal appearance.   Genitourinary:      Urethral meatus normal.      No lesions in the vagina.      Right Labia: No rash, tenderness, lesions or skin changes.     Left Labia: No tenderness, lesions, skin changes or rash.     No inguinal adenopathy present in the right or left side.     No vaginal discharge, tenderness or bleeding.      No vaginal prolapse present.       Right Adnexa: not tender, not full and no mass present.     Left Adnexa: not tender and not full.     No cervical motion tenderness, friability, " lesion, polyp or eversion.      Uterus is not enlarged or tender.      No uterine mass detected.  Breasts:     Breasts are symmetrical.      Right: No mass, nipple discharge, skin change or tenderness.      Left: No mass, nipple discharge, skin change or tenderness.   HENT:      Head: Normocephalic and atraumatic.   Neck:      Thyroid: No thyroid mass or thyromegaly.   Pulmonary:      Effort: Pulmonary effort is normal.   Abdominal:      General: Abdomen is flat.      Hernia: There is no hernia in the left inguinal area or right inguinal area.   Musculoskeletal:      Cervical back: Neck supple.      Right lower leg: No edema.      Left lower leg: No edema.   Lymphadenopathy:      Cervical: No cervical adenopathy.      Upper Body:      Right upper body: No supraclavicular or axillary adenopathy.      Left upper body: No supraclavicular or axillary adenopathy.      Lower Body: No right inguinal adenopathy. No left inguinal adenopathy.   Neurological:      General: No focal deficit present.      Mental Status: She is alert. Mental status is at baseline.   Skin:     General: Skin is warm and dry.   Psychiatric:         Mood and Affect: Mood normal.         Behavior: Behavior normal.         Thought Content: Thought content normal.         Judgment: Judgment normal.   Vitals reviewed.

## 2025-06-06 ENCOUNTER — HOSPITAL ENCOUNTER (OUTPATIENT)
Dept: RADIOLOGY | Age: 55
Discharge: HOME | End: 2025-06-06
Payer: COMMERCIAL

## 2025-06-06 VITALS — WEIGHT: 191 LBS | HEIGHT: 61 IN | BODY MASS INDEX: 36.06 KG/M2

## 2025-06-06 DIAGNOSIS — Z12.31 VISIT FOR SCREENING MAMMOGRAM: ICD-10-CM

## 2025-06-06 PROCEDURE — 77063 BREAST TOMOSYNTHESIS BI: CPT

## 2025-06-06 PROCEDURE — 77067 SCR MAMMO BI INCL CAD: CPT

## 2025-06-10 ENCOUNTER — RESULTS FOLLOW-UP (OUTPATIENT)
Dept: OBGYN CLINIC | Facility: CLINIC | Age: 55
End: 2025-06-10

## 2025-08-06 ENCOUNTER — OFFICE VISIT (OUTPATIENT)
Dept: INTERNAL MEDICINE CLINIC | Age: 55
End: 2025-08-06
Payer: COMMERCIAL

## 2025-08-06 VITALS
OXYGEN SATURATION: 97 % | WEIGHT: 196.2 LBS | TEMPERATURE: 97.9 F | DIASTOLIC BLOOD PRESSURE: 72 MMHG | SYSTOLIC BLOOD PRESSURE: 110 MMHG | HEIGHT: 61 IN | HEART RATE: 77 BPM | BODY MASS INDEX: 37.04 KG/M2

## 2025-08-06 DIAGNOSIS — T63.441A BEE STING REACTION, ACCIDENTAL OR UNINTENTIONAL, INITIAL ENCOUNTER: ICD-10-CM

## 2025-08-06 DIAGNOSIS — Z00.00 ANNUAL PHYSICAL EXAM: Primary | ICD-10-CM

## 2025-08-06 DIAGNOSIS — L03.113 CELLULITIS OF RIGHT UPPER EXTREMITY: ICD-10-CM

## 2025-08-06 PROCEDURE — 99396 PREV VISIT EST AGE 40-64: CPT | Performed by: INTERNAL MEDICINE

## 2025-08-06 PROCEDURE — 99213 OFFICE O/P EST LOW 20 MIN: CPT | Performed by: INTERNAL MEDICINE

## 2025-08-06 RX ORDER — PREDNISONE 20 MG/1
20 TABLET ORAL DAILY
Qty: 5 TABLET | Refills: 0 | Status: SHIPPED | OUTPATIENT
Start: 2025-08-06

## 2025-08-06 RX ORDER — CEPHALEXIN 500 MG/1
500 CAPSULE ORAL EVERY 12 HOURS SCHEDULED
Qty: 10 CAPSULE | Refills: 0 | Status: SHIPPED | OUTPATIENT
Start: 2025-08-06 | End: 2025-08-11